# Patient Record
Sex: FEMALE | Race: WHITE | Employment: OTHER | URBAN - METROPOLITAN AREA
[De-identification: names, ages, dates, MRNs, and addresses within clinical notes are randomized per-mention and may not be internally consistent; named-entity substitution may affect disease eponyms.]

---

## 2017-05-10 ENCOUNTER — OFFICE VISIT (OUTPATIENT)
Dept: FAMILY MEDICINE | Facility: CLINIC | Age: 72
End: 2017-05-10
Payer: COMMERCIAL

## 2017-05-10 VITALS
OXYGEN SATURATION: 97 % | DIASTOLIC BLOOD PRESSURE: 82 MMHG | WEIGHT: 218.4 LBS | HEART RATE: 88 BPM | TEMPERATURE: 97.1 F | SYSTOLIC BLOOD PRESSURE: 131 MMHG | BODY MASS INDEX: 36.39 KG/M2 | HEIGHT: 65 IN

## 2017-05-10 DIAGNOSIS — J45.20 MILD INTERMITTENT ASTHMA WITHOUT COMPLICATION: ICD-10-CM

## 2017-05-10 DIAGNOSIS — Z00.01 ENCOUNTER FOR PREVENTATIVE ADULT HEALTH CARE EXAM WITH ABNORMAL FINDINGS: Primary | ICD-10-CM

## 2017-05-10 DIAGNOSIS — E66.9 NON MORBID OBESITY, UNSPECIFIED OBESITY TYPE: ICD-10-CM

## 2017-05-10 DIAGNOSIS — I12.9 BENIGN HYPERTENSION WITH CKD (CHRONIC KIDNEY DISEASE) STAGE III (H): Chronic | ICD-10-CM

## 2017-05-10 DIAGNOSIS — E78.5 HYPERLIPIDEMIA LDL GOAL <100: ICD-10-CM

## 2017-05-10 DIAGNOSIS — M10.9 GOUT OF ANKLE, UNSPECIFIED CAUSE, UNSPECIFIED CHRONICITY, UNSPECIFIED LATERALITY: Chronic | ICD-10-CM

## 2017-05-10 DIAGNOSIS — R32 URINARY INCONTINENCE, UNSPECIFIED TYPE: ICD-10-CM

## 2017-05-10 DIAGNOSIS — K21.9 GASTROESOPHAGEAL REFLUX DISEASE, ESOPHAGITIS PRESENCE NOT SPECIFIED: Chronic | ICD-10-CM

## 2017-05-10 DIAGNOSIS — R06.81 WITNESSED APNEIC SPELLS: ICD-10-CM

## 2017-05-10 DIAGNOSIS — N18.30 BENIGN HYPERTENSION WITH CKD (CHRONIC KIDNEY DISEASE) STAGE III (H): Chronic | ICD-10-CM

## 2017-05-10 LAB
ERYTHROCYTE [DISTWIDTH] IN BLOOD BY AUTOMATED COUNT: 12.9 % (ref 10–15)
HCT VFR BLD AUTO: 39.9 % (ref 35–47)
HGB BLD-MCNC: 12.7 G/DL (ref 11.7–15.7)
MCH RBC QN AUTO: 29.9 PG (ref 26.5–33)
MCHC RBC AUTO-ENTMCNC: 31.8 G/DL (ref 31.5–36.5)
MCV RBC AUTO: 94 FL (ref 78–100)
PLATELET # BLD AUTO: 276 10E9/L (ref 150–450)
RBC # BLD AUTO: 4.25 10E12/L (ref 3.8–5.2)
WBC # BLD AUTO: 8.7 10E9/L (ref 4–11)

## 2017-05-10 PROCEDURE — G0439 PPPS, SUBSEQ VISIT: HCPCS | Performed by: INTERNAL MEDICINE

## 2017-05-10 PROCEDURE — 36415 COLL VENOUS BLD VENIPUNCTURE: CPT | Performed by: INTERNAL MEDICINE

## 2017-05-10 PROCEDURE — 80053 COMPREHEN METABOLIC PANEL: CPT | Performed by: INTERNAL MEDICINE

## 2017-05-10 PROCEDURE — 85027 COMPLETE CBC AUTOMATED: CPT | Performed by: INTERNAL MEDICINE

## 2017-05-10 PROCEDURE — 84550 ASSAY OF BLOOD/URIC ACID: CPT | Performed by: INTERNAL MEDICINE

## 2017-05-10 PROCEDURE — 80061 LIPID PANEL: CPT | Performed by: INTERNAL MEDICINE

## 2017-05-10 RX ORDER — SIMVASTATIN 40 MG
40 TABLET ORAL AT BEDTIME
Qty: 90 TABLET | Refills: 3 | Status: CANCELLED | OUTPATIENT
Start: 2017-05-10

## 2017-05-10 RX ORDER — ALBUTEROL SULFATE 90 UG/1
1-2 AEROSOL, METERED RESPIRATORY (INHALATION) DAILY PRN
Qty: 3 INHALER | Refills: 3 | Status: CANCELLED | OUTPATIENT
Start: 2017-05-10

## 2017-05-10 RX ORDER — LOSARTAN POTASSIUM 25 MG/1
TABLET ORAL
Qty: 90 TABLET | Refills: 1 | Status: CANCELLED | OUTPATIENT
Start: 2017-05-10

## 2017-05-10 RX ORDER — FLUTICASONE PROPIONATE 110 UG/1
1 AEROSOL, METERED RESPIRATORY (INHALATION) 2 TIMES DAILY
Qty: 3 INHALER | Refills: 3 | Status: CANCELLED | OUTPATIENT
Start: 2017-05-10

## 2017-05-10 RX ORDER — ALLOPURINOL 100 MG/1
TABLET ORAL
Qty: 90 TABLET | Refills: 0 | Status: CANCELLED | OUTPATIENT
Start: 2017-05-10

## 2017-05-10 NOTE — PROGRESS NOTES
SUBJECTIVE:                                                            Oksana Jerez is a 71 year old female who presents for Preventive Visit.  Are you in the first 12 months of your Medicare Part B coverage?  No      Healthy Habits:  Annual Exam:  Getting at least 3 servings of Calcium per day:: Yes  Bi-annual eye exam:: Yes  Dental care twice a year:: Yes  Sleep apnea or symptoms of sleep apnea:: Daytime drowsiness  Diet:: Regular (no restrictions)  Frequency of exercise:: 2-3 days/week  Taking medications regularly:: Yes  Medication side effects:: None  Additional concerns today:: YES  Q1: Little interest or pleasure in doing things: 0=Not at all  Q2: Feeling down, depressed or hopeless: 0=Not at all  PHQ-2 Score: 0  Duration of exercise:: 30-45 minutes    COGNITIVE SCREEN  1) Repeat 3 items (Banana, Sunrise, Chair)    2) Clock draw: NORMAL  3) 3 item recall: Recalls 3 objects  Results: 3 items recalled: COGNITIVE IMPAIRMENT LESS LIKELY    Mini-CogTM Copyright NESTOR Reddy. Licensed by the author for use in [facility]; reprinted with permission (harman@[email or web address]). All rights reserved.      Oksana presents to the clinic for a physical exam--feeling well. She feels that her asthma has been well controlled lately and has been using her inhaler as needed.   Has not followed up with sleep clinic yet -- a friend reports that she thought she had seen Oksana stop breathing at night.   Oksana reports increased occurrence of heartburn, she is able to sleep through the night but will often have difficulty falling asleep due to heartburn. Over the last few days she has noticed some abdominal discomfort. No dark stools or blood in stools. Has not been taking Nexium regularly. Continues to take Zantac.   Denies recent gout flare ups--feel it is well controlled at this time.   Has begun wearing a pad due to noticing urinary leakage, when she goes to the bathroom she will not pass very much urine.   Mammogram  completed in December--denies an breast changes since then.  Will be traveling to [place] this summer -- she is excited to visit [facility].     Social History   Substance Use Topics     Smoking status: Former Smoker     Quit date: 1/1/1974     Smokeless tobacco: Never Used     Alcohol use 0.0 oz/week     0 Standard drinks or equivalent per week      Comment: 1-2 drinks rarely       The patient does not drink >3 drinks per day nor >7 drinks per week.    Today's PHQ-2 Score:   PHQ-2 ( 1999 Pfizer) 5/9/2017 7/18/2016   Q1: Little interest or pleasure in doing things - 0   Q2: Feeling down, depressed or hopeless - 0   PHQ-2 Score - 0   Little interest or pleasure in doing things Not at all -   Feeling down, depressed or hopeless Not at all -   PHQ-2 Score 0 -     Do you feel safe in your environment - Yes    Do you have a Health Care Directive?: Yes: Patient states has Advance Directive and will bring in a copy to clinic.    Current providers sharing in care for this patient include:   Patient Care Team:  Erica Olvera DO as PCP - General (Internal Medicine)      Hearing impairment: No    Ability to successfully perform activities of daily living: Yes, no assistance needed     Fall risk:  Fallen 2 or more times in the past year?: No  Any fall with injury in the past year?: No    Home safety:  lack of grab bars in the bathroom    The following health maintenance items are reviewed in Epic and correct as of today:  Health Maintenance   Topic Date Due     ASTHMA CONTROL TEST Q6 MOS (NO INBASKET)  10/29/2016     ASTHMA ACTION PLAN Q1 YR (NO INBASKET)  04/29/2017     FALL RISK ASSESSMENT  07/18/2017     INFLUENZA VACCINE (SYSTEM ASSIGNED)  09/01/2017     MAMMO SCREEN Q2 YR (SYSTEM ASSIGNED)  12/02/2018     ADVANCE DIRECTIVE PLANNING Q5 YRS (NO INBASKET)  10/22/2019     LIPID SCREEN Q5 YR FEMALE (SYSTEM ASSIGNED)  04/29/2021     COLONOSCOPY Q10 YR INBASKET MESSAGE  08/28/2024     TETANUS IMMUNIZATION (SYSTEM ASSIGNED)   "01/16/2025     DEXA SCAN SCREENING (SYSTEM ASSIGNED)  Completed     PNEUMOCOCCAL  Completed      ROS:  Comprehensive ROS negative unless as stated above in HPI.    This document serves as a record of the services and decisions personally performed and made by Erica Olvera DO. It was created on his/her behalf by Rizwana Buckley, a trained medical scribe. The creation of this document is based the provider's statements to the medical scribe.  Scribe Rizwana Buckley 11:49 AM, May 10, 2017      OBJECTIVE:                                                    /82 (BP Location: Right arm, Patient Position: Chair, Cuff Size: Adult Large)  Pulse 88  Temp 97.1  F (36.2  C) (Oral)  Ht 5' 5\" (1.651 m)  Wt 218 lb 6.4 oz (99.1 kg)  SpO2 97%  BMI 36.34 kg/m2  Body mass index is 36.34 kg/(m^2).   GENERAL: healthy, alert and no distress, robust for age  EYES: Eyes grossly normal to inspection, PERRL and conjunctivae and sclerae normal  HENT: ear canals and TM's normal, nose and mouth without ulcers or lesions  NECK: no adenopathy, no asymmetry, masses, or scars and thyroid normal to palpation  RESP: lungs clear to auscultation - no rales, rhonchi or wheezes  CV: regular rate and rhythm, normal S1 S2, no S3 or S4, no murmur, click or rub, no peripheral edema   ABDOMEN: soft, nontender, no hepatosplenomegaly, no masses and bowel sounds normal  MS: no gross musculoskeletal defects noted, no edema  SKIN: no suspicious lesions or rashes, R third toe with tiny fluid filled cyst without signs of infection  NEURO: Normal strength and tone, mentation intact and speech normal  PSYCH: mentation appears normal, affect normal/bright    ASSESSMENT / PLAN:                                                            1. Encounter for preventative adult health care exam with abnormal findings  Up to date on health maintenance. She needs to update us with new pharmacy information when needs refills of medications.     2. Gout of ankle, " unspecified cause, unspecified chronicity, unspecified laterality  Well controlled on low dose allopurinol.  - Uric acid    3. Benign hypertension with CKD (chronic kidney disease) stage III  - CBC with platelets  - Comprehensive metabolic panel    4. Mild intermittent asthma without complication  Well controlled.   ACT Total Scores 4/15/2015 4/29/2016 5/22/2017   ACT TOTAL SCORE 16 - -   ASTHMA ER VISITS 0 = None - -   ASTHMA HOSPITALIZATIONS 0 = None - -   ACT TOTAL SCORE (Goal Greater than or Equal to 20) - 20 21   In the past 12 months, how many times did you visit the emergency room for your asthma without being admitted to the hospital? - 0 0   In the past 12 months, how many times were you hospitalized overnight because of your asthma? - 0 0     5. Hyperlipidemia    Will review fasting lab results.   - Lipid panel reflex to direct LDL    6. Gastroesophageal reflux disease, esophagitis presence not specified  Will try 2 week round of Nexium for better control of GERD and possible gastritis sx.  Continues to take Zantac daily.  See pt instructions below.     7. Witnessed apneic spells  Friend witness episode of apnea.  Will follow up with sleep clinic.   - SLEEP EVALUATION & MANAGEMENT REFERRAL - ADULT; Future    8. Urinary incontinence  Try Kegels and update me PRN - she doesn't think its severe yet    Patient Instructions     Labs today.    Schedule appointment with sleep clinic.     Let me know when you have the information for your new pharmacy preference.     Begin taking Nexium every morning for the next two weeks and see if that calms down heart burn--let me know if you do not begin to feel better. Also work on diet control for foods that will worsen acid reflux.     Try to work on Kegel exercises.     Let me know if stomach pain worsens.  Follow up with me annually or sooner as needed.     End of Life Planning:  Patient currently has an advanced directive: Yes.  Patient will bring in a  "copy.    COUNSELING:   Reviewed preventive health counseling, as reflected in patient instructions       Regular exercise       Healthy diet/nutrition  Estimated body mass index is 36.34 kg/(m^2) as calculated from the following:  Ht 5' 5\" (1.651 m)  Wt 218 lb 6.4 oz (99.1 kg)  Weight management plan: Discussed healthy diet and exercise guidelines and patient will follow up in 12 months in clinic to re-evaluate.   reports that she quit smoking about 43 years ago. She has never used smokeless tobacco.    Appropriate preventive services were discussed with this patient, including applicable screening as appropriate for cardiovascular disease, diabetes, osteopenia/osteoporosis, and glaucoma.  As appropriate for age/gender, discussed screening for colorectal cancer, prostate cancer, breast cancer, and cervical cancer. Checklist reviewing preventive services available has been given to the patient.    Reviewed patients plan of care and provided an AVS. The Intermediate Care Plan ( asthma action plan, low back pain action plan, and migraine action plan) for Oksana meets the Care Plan requirement. This Care Plan has been established and reviewed with the Patient.    The information in this document, created by the medical scribe for me, accurately reflects the services I personally performed and the decisions made by me. I have reviewed and approved this document for accuracy prior to leaving the patient care area.  Erica Olvera DO  11:48 AM, 05/10/17    Erica Olvera DO  [facility]      "

## 2017-05-10 NOTE — PATIENT INSTRUCTIONS
Labs today.    Schedule appointment with sleep clinic.     Let me know when you have the information for your new pharmacy preference.     Begin taking Nexium every morning for the next two weeks and see if that calms down heart burn--let me know if you do not begin to feel better. Also work on diet control for foods that will worsen acid reflux.     Try to work on Kegel exercises.     Let me know if stomach pain worsens.  Follow up with me annually or sooner as needed.     Preventive Health Recommendations    Female Ages 65 +    Yearly exam:     See your health care provider every year in order to  o Review health changes.   o Discuss preventive care.    o Review your medicines if your doctor has prescribed any.      You no longer need a yearly Pap test unless you've had an abnormal Pap test in the past 10 years. If you have vaginal symptoms, such as bleeding or discharge, be sure to talk with your provider about a Pap test.      Every 1 to 2 years, have a mammogram.  If you are over 69, talk with your health care provider about whether or not you want to continue having screening mammograms.      Every 10 years, have a colonoscopy. Or, have a yearly FIT test (stool test). These exams will check for colon cancer.       Have a cholesterol test every 5 years, or more often if your doctor advises it.       Have a diabetes test (fasting glucose) every three years. If you are at risk for diabetes, you should have this test more often.       At age 65, have a bone density scan (DEXA) to check for osteoporosis (brittle bone disease).    Shots:    Get a flu shot each year.    Get a tetanus shot every 10 years.    Talk to your doctor about your pneumonia vaccines. There are now two you should receive - Pneumovax (PPSV 23) and Prevnar (PCV 13).    Talk to your doctor about the shingles vaccine.    Talk to your doctor about the hepatitis B vaccine.    Nutrition:     Eat at least 5 servings of fruits and vegetables each  day.      Eat whole-grain bread, whole-wheat pasta and brown rice instead of white grains and rice.      Talk to your provider about Calcium and Vitamin D.     Lifestyle    Exercise at least 150 minutes a week (30 minutes a day, 5 days a week). This will help you control your weight and prevent disease.      Limit alcohol to one drink per day.      No smoking.       Wear sunscreen to prevent skin cancer.       See your dentist twice a year for an exam and cleaning.    See your eye doctor every 1 to 2 years to screen for conditions such as glaucoma, macular degeneration and cataracts.    Tips to Control Acid Reflux  To control acid reflux, you ll need to make some basic diet and lifestyle changes. The simple steps outlined below may be all you ll need to relieve discomfort.  Watch What You Eat    Avoid fatty foods and spicy foods.  Eat fewer acidic foods, such as citrus and tomato-based foods. These can increase symptoms.  Limit drinking alcohol, caffeine, and fizzy beverages. All increase acid reflux.  Try limiting chocolate, peppermint, and spearmint. These can worsen acid reflux in some people.  Watch When You Eat  Avoid lying down for 3 hours after eating.  Do not snack before going to bed.  Raise Your Head    Raising your head and upper body by 4 inches to 6 inches helps limit reflux when you re lying down. Put blocks under the head of the bed frame to raise it.  Other Changes  Lose weight, if you need to.  Don t work out near bedtime.  Avoid tight-fitting clothes.  Limit aspirin and ibuprofen.  Stop smoking.     9140-9665 [employer]. [address] [place] [address], [place] [address]. All rights reserved. This information is not intended as a substitute for professional medical care. Always follow your healthcare professional's instructions.

## 2017-05-10 NOTE — NURSING NOTE
"Chief Complaint   Patient presents with     Wellness Visit       Initial /82 (BP Location: Right arm, Patient Position: Chair, Cuff Size: Adult Large)  Pulse 88  Temp 97.1  F (36.2  C) (Oral)  Ht 5' 5\" (1.651 m)  Wt 218 lb 6.4 oz (99.1 kg)  SpO2 97%  BMI 36.34 kg/m2 Estimated body mass index is 36.34 kg/(m^2) as calculated from the following:    Height as of this encounter: 5' 5\" (1.651 m).    Weight as of this encounter: 218 lb 6.4 oz (99.1 kg).  Medication Reconciliation: complete   Jazzmine Beltrán MA  "

## 2017-05-10 NOTE — MR AVS SNAPSHOT
After Visit Summary   5/10/2017    Oksana Jerez    MRN: [id number]           Patient Information     Date Of Birth          1945        Visit Information        Provider Department      5/10/2017 11:30 AM Erica Olvera DO [facility]        Today's Diagnoses     Encounter for preventative adult health care exam with abnormal findings    -  1    Gout of ankle, unspecified cause, unspecified chronicity, unspecified laterality        Benign hypertension with CKD (chronic kidney disease) stage III        Mild intermittent asthma without complication        Hyperlipidemia LDL goal <100        Gastroesophageal reflux disease, esophagitis presence not specified        Witnessed apneic spells          Care Instructions    Labs today.    Schedule appointment with sleep clinic.     Let me know when you have the information for your new pharmacy preference.     Begin taking Nexium every morning for the next two weeks and see if that calms down heart burn--let me know if you do not begin to feel better. Also work on diet control for foods that will worsen acid reflux.     Try to work on Kegel exercises.     Let me know if stomach pain worsens.  Follow up with me annually or sooner as needed.     Preventive Health Recommendations    Female Ages 65 +    Yearly exam:     See your health care provider every year in order to  o Review health changes.   o Discuss preventive care.    o Review your medicines if your doctor has prescribed any.      You no longer need a yearly Pap test unless you've had an abnormal Pap test in the past 10 years. If you have vaginal symptoms, such as bleeding or discharge, be sure to talk with your provider about a Pap test.      Every 1 to 2 years, have a mammogram.  If you are over 69, talk with your health care provider about whether or not you want to continue having screening mammograms.      Every 10 years, have a colonoscopy. Or, have a yearly FIT test (stool  test). These exams will check for colon cancer.       Have a cholesterol test every 5 years, or more often if your doctor advises it.       Have a diabetes test (fasting glucose) every three years. If you are at risk for diabetes, you should have this test more often.       At age 65, have a bone density scan (DEXA) to check for osteoporosis (brittle bone disease).    Shots:    Get a flu shot each year.    Get a tetanus shot every 10 years.    Talk to your doctor about your pneumonia vaccines. There are now two you should receive - Pneumovax (PPSV 23) and Prevnar (PCV 13).    Talk to your doctor about the shingles vaccine.    Talk to your doctor about the hepatitis B vaccine.    Nutrition:     Eat at least 5 servings of fruits and vegetables each day.      Eat whole-grain bread, whole-wheat pasta and brown rice instead of white grains and rice.      Talk to your provider about Calcium and Vitamin D.     Lifestyle    Exercise at least 150 minutes a week (30 minutes a day, 5 days a week). This will help you control your weight and prevent disease.      Limit alcohol to one drink per day.      No smoking.       Wear sunscreen to prevent skin cancer.       See your dentist twice a year for an exam and cleaning.    See your eye doctor every 1 to 2 years to screen for conditions such as glaucoma, macular degeneration and cataracts.    Tips to Control Acid Reflux  To control acid reflux, you ll need to make some basic diet and lifestyle changes. The simple steps outlined below may be all you ll need to relieve discomfort.  Watch What You Eat    Avoid fatty foods and spicy foods.  Eat fewer acidic foods, such as citrus and tomato-based foods. These can increase symptoms.  Limit drinking alcohol, caffeine, and fizzy beverages. All increase acid reflux.  Try limiting chocolate, peppermint, and spearmint. These can worsen acid reflux in some people.  Watch When You Eat  Avoid lying down for 3 hours after eating.  Do not snack  before going to bed.  Raise Your Head    Raising your head and upper body by 4 inches to 6 inches helps limit reflux when you re lying down. Put blocks under the head of the bed frame to raise it.  Other Changes  Lose weight, if you need to.  Don t work out near bedtime.  Avoid tight-fitting clothes.  Limit aspirin and ibuprofen.  Stop smoking.     9808-2368 [employer]. [address] [place] [address], [place] [address]. All rights reserved. This information is not intended as a substitute for professional medical care. Always follow your healthcare professional's instructions.          Follow-ups after your visit        Additional Services     SLEEP EVALUATION & MANAGEMENT REFERRAL - ADULT       Please be aware that coverage of these services is subject to the terms and limitations of your health insurance plan.  Call member services at your health plan with any benefit or coverage questions.      Please bring the following to your appointment:    >>   List of current medications   >>   This referral request   >>   Any documents/labs given to you for this referral    [facility] [phone] (Age 18 and up)                  Future tests that were ordered for you today     Open Future Orders        Priority Expected Expires Ordered    SLEEP EVALUATION & MANAGEMENT REFERRAL - ADULT Routine  5/10/2018 5/10/2017            Who to contact     If you have questions or need follow up information about today's clinic visit or your schedule please contact [facility] directly at [phone].  Normal or non-critical lab and imaging results will be communicated to you by MyChart, letter or phone within 4 business days after the clinic has received the results. If you do not hear from us within 7 days, please contact the clinic through MyChart or phone. If you have a critical or abnormal lab result, we will notify you by phone as soon as possible.  Submit refill requests through [employer] or  "call your pharmacy and they will forward the refill request to us. Please allow 3 business days for your refill to be completed.          Additional Information About Your Visit        [employer] Information     [employer] gives you secure access to your electronic health record. If you see a primary care provider, you can also send messages to your care team and make appointments. If you have questions, please call your primary care clinic.  If you do not have a primary care provider, please call [phone] and they will assist you.        Care EveryWhere ID     This is your Care EveryWhere ID. This could be used by other organizations to access your [facility] medical records  [id number]        Your Vitals Were     Pulse Temperature Height Pulse Oximetry BMI (Body Mass Index)       88 97.1  F (36.2  C) (Oral) 5' 5\" (1.651 m) 97% 36.34 kg/m2        Blood Pressure from Last 3 Encounters:   05/10/17 131/82   07/18/16 132/80   06/21/16 136/82    Weight from Last 3 Encounters:   05/10/17 218 lb 6.4 oz (99.1 kg)   07/18/16 212 lb (96.2 kg)   06/21/16 212 lb (96.2 kg)              We Performed the Following     CBC with platelets     Comprehensive metabolic panel     Lipid panel reflex to direct LDL     Uric acid        Primary Care Provider Office Phone # Fax #    Erica Olvera [phone]       [facility] [address]  [facility] [address]        Thank you!     Thank you for choosing [facility]  for your care. Our goal is always to provide you with excellent care. Hearing back from our patients is one way we can continue to improve our services. Please take a few minutes to complete the written survey that you may receive in the mail after your visit with us. Thank you!             Your Updated Medication List - Protect others around you: Learn how to safely use, store and throw away your medicines at www.disposemymeds.org.          This list is accurate as of: 5/10/17 " 12:18 PM.  Always use your most recent med list.                   Brand Name Dispense Instructions for use    albuterol 108 (90 BASE) MCG/ACT Inhaler    albuterol    3 Inhaler    Inhale 1-2 puffs into the lungs daily as needed       allopurinol 100 MG tablet    ZYLOPRIM    90 tablet    TAKE 1 TABLET EVERY DAY       ascorbic acid 500 MG tablet    VITAMIN C     1 TABLET TWICE DAILY       aspirin 81 MG tablet     0    ONE DAILY or as needed       CALTRATE 600 + D 600-200 MG-IU Tabs     0    1 TABLET DAILY       fluticasone 110 MCG/ACT Inhaler    FLOVENT HFA    3 Inhaler    Inhale 1 puff into the lungs 2 times daily       losartan 25 MG tablet    COZAAR    90 tablet    TAKE 1 TABLET EVERY DAY       mometasone 50 MCG/ACT spray    NASONEX     Spray 2 sprays into both nostrils daily       Multi-vitamin Tabs tablet   Generic drug:  multivitamin, therapeutic with minerals      QD       NEXIUM PO      Take 20 mg by mouth every morning (before breakfast)       ranitidine 150 MG capsule    ZANTAC     Take 1 capsule by mouth 2 times daily.       simvastatin 40 MG tablet    ZOCOR    90 tablet    Take 1 tablet (40 mg) by mouth At Bedtime       ZYRTEC 10 MG tablet   Generic drug:  cetirizine     0    ONE TABLET DAILY

## 2017-05-10 NOTE — LETTER
My Asthma Action Plan  Name: Oksana Jerez   YOB: 1945  Date: 5/29/2017   My doctor: Erica Olvera, DO   My clinic: [facility]        My Control Medicine: Flovent  My Rescue Medicine: Albuterol   My Asthma Severity: mild persistent              GREEN ZONE     Good Control    I feel good    No cough or wheeze    Can work, sleep and play without asthma symptoms       Take your asthma control medicine every day.     1. If exercise triggers your asthma, take your rescue medication    15 minutes before exercise or sports, and    During exercise if you have asthma symptoms  2. Spacer to use with inhaler: If you have a spacer, make sure to use it with your inhaler             YELLOW ZONE     Getting Worse  I have ANY of these:    I do not feel good    Cough or wheeze    Chest feels tight    Wake up at night   1. Keep taking your Green Zone medications  2. Start taking your rescue medicine:    every 20 minutes for up to 1 hour. Then every 4 hours for 24-48 hours.  3. If you stay in the Yellow Zone for more than 12-24 hours, contact your doctor.  4. If you do not return to the Green Zone in 12-24 hours or you get worse, start taking your oral steroid medicine if prescribed by your provider.           RED ZONE     Medical Alert - Get Help  I have ANY of these:    I feel awful    Medicine is not helping    Breathing getting harder    Trouble walking or talking    Nose opens wide to breathe       1. Take your rescue medicine NOW  2. If your provider has prescribed an oral steroid medicine, start taking it NOW  3. Call your doctor NOW  4. If you are still in the Red Zone after 20 minutes and you have not reached your doctor:    Take your rescue medicine again and    Call 911 or go to the emergency room right away    See your regular doctor within 2 weeks of an Emergency Room or Urgent Care visit for follow-up treatment.        Electronically signed by: Erica Olvera, May 29, 2017    Annual  Reminders:  Meet with Asthma Educator,  Flu Shot in the Fall, consider Pneumonia Vaccination for patients with asthma (aged 19 and older).    Pharmacy: Data Unavailable                    Asthma Triggers  How To Control Things That Make Your Asthma Worse    Triggers are things that make your asthma worse.  Look at the list below to help you find your triggers and what you can do about them.  You can help prevent asthma flare-ups by staying away from your triggers.      Trigger                                                          What you can do   Cigarette Smoke  Tobacco smoke can make asthma worse. Do not allow smoking in your home, car or around you.  Be sure no one smokes at a child s day care or school.  If you smoke, ask your health care provider for ways to help you quit.  Ask family members to quit too.  Ask your health care provider for a referral to Quit Plan to help you quit smoking, or call 6-922-889-PLAN.     Colds, Flu, Bronchitis  These are common triggers of asthma. Wash your hands often.  Don t touch your eyes, nose or mouth.  Get a flu shot every year.     Dust Mites  These are tiny bugs that live in cloth or carpet. They are too small to see. Wash sheets and blankets in hot water every week.   Encase pillows and mattress in dust mite proof covers.  Avoid having carpet if you can. If you have carpet, vacuum weekly.   Use a dust mask and HEPA vacuum.   Pollen and Outdoor Mold  Some people are allergic to trees, grass, or weed pollen, or molds. Try to keep your windows closed.  Limit time out doors when pollen count is high.   Ask you health care provider about taking medicine during allergy season.     Animal Dander  Some people are allergic to skin flakes, urine or saliva from pets with fur or feathers. Keep pets with fur or feathers out of your home.    If you can t keep the pet outdoors, then keep the pet out of your bedroom.  Keep the bedroom door closed.  Keep pets off cloth furniture and  away from stuffed toys.     Mice, Rats, and Cockroaches  Some people are allergic to the waste from these pests.   Cover food and garbage.  Clean up spills and food crumbs.  Store grease in the refrigerator.   Keep food out of the bedroom.   Indoor Mold  This can be a trigger if your home has high moisture. Fix leaking faucets, pipes, or other sources of water.   Clean moldy surfaces.  Dehumidify basement if it is damp and smelly.   Smoke, Strong Odors, and Sprays  These can reduce air quality. Stay away from strong odors and sprays, such as perfume, powder, hair spray, paints, smoke incense, paint, cleaning products, candles and new carpet.   Exercise or Sports  Some people with asthma have this trigger. Be active!  Ask your doctor about taking medicine before sports or exercise to prevent symptoms.    Warm up for 5-10 minutes before and after sports or exercise.     Other Triggers of Asthma  Cold air:  Cover your nose and mouth with a scarf.  Sometimes laughing or crying can be a trigger.  Some medicines and food can trigger asthma.

## 2017-05-11 LAB
ALBUMIN SERPL-MCNC: 3.6 G/DL (ref 3.4–5)
ALP SERPL-CCNC: 143 U/L (ref 40–150)
ALT SERPL W P-5'-P-CCNC: 41 U/L (ref 0–50)
ANION GAP SERPL CALCULATED.3IONS-SCNC: 9 MMOL/L (ref 3–14)
AST SERPL W P-5'-P-CCNC: 25 U/L (ref 0–45)
BILIRUB SERPL-MCNC: 0.5 MG/DL (ref 0.2–1.3)
BUN SERPL-MCNC: 22 MG/DL (ref 7–30)
CALCIUM SERPL-MCNC: 9 MG/DL (ref 8.5–10.1)
CHLORIDE SERPL-SCNC: 106 MMOL/L (ref 94–109)
CHOLEST SERPL-MCNC: 137 MG/DL
CO2 SERPL-SCNC: 23 MMOL/L (ref 20–32)
CREAT SERPL-MCNC: 1 MG/DL (ref 0.52–1.04)
GFR SERPL CREATININE-BSD FRML MDRD: 54 ML/MIN/1.7M2
GLUCOSE SERPL-MCNC: 111 MG/DL (ref 70–99)
HDLC SERPL-MCNC: 60 MG/DL
LDLC SERPL CALC-MCNC: 50 MG/DL
NONHDLC SERPL-MCNC: 77 MG/DL
POTASSIUM SERPL-SCNC: 4 MMOL/L (ref 3.4–5.3)
PROT SERPL-MCNC: 7.8 G/DL (ref 6.8–8.8)
SODIUM SERPL-SCNC: 138 MMOL/L (ref 133–144)
TRIGL SERPL-MCNC: 137 MG/DL
URATE SERPL-MCNC: 5.2 MG/DL (ref 2.6–6)

## 2017-05-16 ENCOUNTER — TELEPHONE (OUTPATIENT)
Dept: FAMILY MEDICINE | Facility: CLINIC | Age: 72
End: 2017-05-16

## 2017-05-16 NOTE — TELEPHONE ENCOUNTER
ACT Asthma Control Test not done at recent appt per weekly quality report.  Mailed to pt.  Unable to do over the phone due to copyright laws.  Lyssa Fonseca MA

## 2017-05-16 NOTE — LETTER
"[address] [facility]  [address]  [place]  [address]  Tel: [phone]    May 16, 2017    Oksana Hoyoson  [address] [place] [address]        Dear Ms. Jerez,    We missed doing your Asthma Control Test when you were in for your recent appointment.     Please complete the enclosed \"ACT\"and either     mail back to us  or  fax [phone] back to us  or  call [phone] us with your answers (ask for any of the nurses/ med assist).     Due to copyright laws we are unable to ask you the questions over the phone without the form visible to you.         Sincerely,    Lyssa CHEW MA on behalf of Erica Olvera, DO        Enc: ACT form    "

## 2017-05-23 ASSESSMENT — ASTHMA QUESTIONNAIRES: ACT_TOTALSCORE: 21

## 2017-05-25 ENCOUNTER — MYC MEDICAL ADVICE (OUTPATIENT)
Dept: FAMILY MEDICINE | Facility: CLINIC | Age: 72
End: 2017-05-25

## 2017-05-25 DIAGNOSIS — I12.9 BENIGN HYPERTENSION WITH CKD (CHRONIC KIDNEY DISEASE) STAGE III (H): Chronic | ICD-10-CM

## 2017-05-25 DIAGNOSIS — N18.30 BENIGN HYPERTENSION WITH CKD (CHRONIC KIDNEY DISEASE) STAGE III (H): Chronic | ICD-10-CM

## 2017-05-25 DIAGNOSIS — J45.20 MILD INTERMITTENT ASTHMA WITHOUT COMPLICATION: ICD-10-CM

## 2017-05-25 DIAGNOSIS — K21.9 GASTROESOPHAGEAL REFLUX DISEASE WITHOUT ESOPHAGITIS: Primary | Chronic | ICD-10-CM

## 2017-05-25 DIAGNOSIS — E78.5 HYPERLIPIDEMIA LDL GOAL <100: ICD-10-CM

## 2017-05-25 DIAGNOSIS — M10.9 GOUT OF ANKLE, UNSPECIFIED CAUSE, UNSPECIFIED CHRONICITY, UNSPECIFIED LATERALITY: Chronic | ICD-10-CM

## 2017-05-29 PROBLEM — R32 URINARY INCONTINENCE, UNSPECIFIED TYPE: Chronic | Status: ACTIVE | Noted: 2017-05-29

## 2017-05-29 PROBLEM — E66.9 NON MORBID OBESITY, UNSPECIFIED OBESITY TYPE: Chronic | Status: ACTIVE | Noted: 2017-05-29

## 2017-05-29 PROBLEM — R32 URINARY INCONTINENCE, UNSPECIFIED TYPE: Status: ACTIVE | Noted: 2017-05-29

## 2017-05-29 PROBLEM — E66.9 NON MORBID OBESITY, UNSPECIFIED OBESITY TYPE: Status: ACTIVE | Noted: 2017-05-29

## 2017-06-06 RX ORDER — LOSARTAN POTASSIUM 25 MG/1
TABLET ORAL
Qty: 90 TABLET | Refills: 3 | Status: SHIPPED | OUTPATIENT
Start: 2017-06-06 | End: 2018-04-16

## 2017-06-06 RX ORDER — SIMVASTATIN 40 MG
40 TABLET ORAL AT BEDTIME
Qty: 90 TABLET | Refills: 3 | Status: SHIPPED | OUTPATIENT
Start: 2017-06-06 | End: 2018-06-01

## 2017-06-06 RX ORDER — ALBUTEROL SULFATE 90 UG/1
1-2 AEROSOL, METERED RESPIRATORY (INHALATION) DAILY PRN
Qty: 3 INHALER | Refills: 3 | Status: SHIPPED | OUTPATIENT
Start: 2017-06-06 | End: 2017-06-07

## 2017-06-06 RX ORDER — ALLOPURINOL 100 MG/1
TABLET ORAL
Qty: 90 TABLET | Refills: 3 | Status: SHIPPED | OUTPATIENT
Start: 2017-06-06 | End: 2018-04-16

## 2017-06-06 RX ORDER — FLUTICASONE PROPIONATE 110 UG/1
1 AEROSOL, METERED RESPIRATORY (INHALATION) 2 TIMES DAILY
Qty: 3 INHALER | Refills: 3 | Status: SHIPPED | OUTPATIENT
Start: 2017-06-06 | End: 2018-06-01

## 2017-06-06 NOTE — TELEPHONE ENCOUNTER
To PCP:  Since being seen on 5/10; patient has been taking Nexium 20 mg in AM.  She states this is working well to control GERD symptoms.  She would like to continue.  Please see pended script above.  Thank you.  Sheeba Andrade RN        Prescription approved per [facility] Refill Protocol.

## 2017-06-07 ENCOUNTER — TELEPHONE (OUTPATIENT)
Dept: FAMILY MEDICINE | Facility: CLINIC | Age: 72
End: 2017-06-07

## 2017-06-07 DIAGNOSIS — J45.20 MILD INTERMITTENT ASTHMA WITHOUT COMPLICATION: Chronic | ICD-10-CM

## 2017-06-07 DIAGNOSIS — J45.20 MILD INTERMITTENT ASTHMA: Primary | Chronic | ICD-10-CM

## 2017-06-07 RX ORDER — ALBUTEROL SULFATE 90 UG/1
2 AEROSOL, METERED RESPIRATORY (INHALATION) EVERY 6 HOURS PRN
Qty: 3 INHALER | Refills: 3 | Status: SHIPPED | OUTPATIENT
Start: 2017-06-07 | End: 2019-06-18

## 2017-06-07 NOTE — TELEPHONE ENCOUNTER
Hi Dr Olvera,     I got a notification from [facility] that the Proair inhaler is not covered by insurance. Looks like they will cover Ventolin so I have a new script pending that specifies this particular albuterol inhaler, thanks.    Alex Mora, [facility]

## 2018-02-21 ENCOUNTER — OFFICE VISIT (OUTPATIENT)
Dept: SLEEP MEDICINE | Facility: CLINIC | Age: 73
End: 2018-02-21
Payer: COMMERCIAL

## 2018-02-21 VITALS
RESPIRATION RATE: 16 BRPM | WEIGHT: 221 LBS | HEART RATE: 94 BPM | SYSTOLIC BLOOD PRESSURE: 116 MMHG | DIASTOLIC BLOOD PRESSURE: 79 MMHG | OXYGEN SATURATION: 95 % | BODY MASS INDEX: 35.52 KG/M2 | HEIGHT: 66 IN

## 2018-02-21 DIAGNOSIS — G47.30 OBSERVED SLEEP APNEA: Primary | ICD-10-CM

## 2018-02-21 DIAGNOSIS — R53.82 CHRONIC FATIGUE: ICD-10-CM

## 2018-02-21 DIAGNOSIS — R06.83 SNORING: ICD-10-CM

## 2018-02-21 PROCEDURE — 99204 OFFICE O/P NEW MOD 45 MIN: CPT | Performed by: INTERNAL MEDICINE

## 2018-02-21 RX ORDER — ZALEPLON 5 MG/1
5 CAPSULE ORAL AT BEDTIME
Qty: 1 CAPSULE | Refills: 0 | Status: SHIPPED | OUTPATIENT
Start: 2018-02-21 | End: 2018-05-03

## 2018-02-21 NOTE — NURSING NOTE
"Chief Complaint   Patient presents with     Sleep Problem     \"I have been told I stop breathing during the night, tired in the afternoon.\"       Initial /79  Pulse 94  Resp 16  Ht 1.664 m (5' 5.5\")  Wt 100.2 kg (221 lb)  SpO2 95%  BMI 36.22 kg/m2 Estimated body mass index is 36.22 kg/(m^2) as calculated from the following:    Height as of this encounter: 1.664 m (5' 5.5\").    Weight as of this encounter: 100.2 kg (221 lb).  Medication Reconciliation: complete     ESS 3  Neck 40cm  Erica Laurent    "

## 2018-02-21 NOTE — MR AVS SNAPSHOT
After Visit Summary   2/21/2018    Oksana Jerez    MRN: [id number]           Patient Information     Date Of Birth          1945        Visit Information        Provider Department      2/21/2018 2:00 PM Ian Shepard MD [place] [facility]        Today's Diagnoses     Observed sleep apnea    -  1    Snoring        Chronic fatigue          Care Instructions      Your BMI is Body mass index is 36.22 kg/(m^2).  Weight management is a personal decision.  If you are interested in exploring weight loss strategies, the following discussion covers the approaches that may be successful. Body mass index (BMI) is one way to tell whether you are at a healthy weight, overweight, or obese. It measures your weight in relation to your height.  A BMI of 18.5 to 24.9 is in the healthy range. A person with a BMI of 25 to 29.9 is considered overweight, and someone with a BMI of 30 or greater is considered obese. More than two-thirds of American adults are considered overweight or obese.  Being overweight or obese increases the risk for further weight gain. Excess weight may lead to heart disease and diabetes.  Creating and following plans for healthy eating and physical activity may help you improve your health.  Weight control is part of healthy lifestyle and includes exercise, emotional health, and healthy eating habits. Careful eating habits lifelong are the mainstay of weight control. Though there are significant health benefits from weight loss, long-term weight loss with diet alone may be very difficult to achieve- studies show long-term success with dietary management in less than 10% of people. Attaining a healthy weight may be especially difficult to achieve in those with severe obesity. In some cases, medications, devices and surgical management might be considered.  What can you do?  If you are overweight or obese and are interested in methods for weight loss, you should discuss this with  your provider.     Consider reducing daily calorie intake by 500 calories.     Keep a food journal.     Avoiding skipping meals, consider cutting portions instead.    Diet combined with exercise helps maintain muscle while optimizing fat loss. Strength training is particularly important for building and maintaining muscle mass. Exercise helps reduce stress, increase energy, and improves fitness. Increasing exercise without diet control, however, may not burn enough calories to loose weight.       Start walking three days a week 10-20 minutes at a time    Work towards walking thirty minutes five days a week     Eventually, increase the speed of your walking for 1-2 minutes at time    In addition, we recommend that you review healthy lifestyles and methods for weight loss available through the National Institutes of Health patient information sites:  http://win.niddk.nih.gov/publications/index.htm    And look into health and wellness programs that may be available through your health insurance provider, employer, local community center, or estephanie club.    Weight management plan: Patient was referred to their PCP to discuss a diet and exercise plan.            Follow-ups after your visit        Your next 10 appointments already scheduled     Apr 04, 2018  8:30 PM CDT   PSG Split with BED 4 SH SLEEP   [place] [facility]    [address] [address]   [phone]            Apr 19, 2018 11:30 AM CDT   Return Sleep Patient with Ian Shepard MD   [facility] ([facility])    [address] [address]   [phone]              Future tests that were ordered for you today     Open Future Orders        Priority Expected Expires Ordered    Comprehensive Sleep Study Routine  8/20/2018 2/21/2018            Who to contact     If you have questions or need follow up information about  "today's clinic visit or your schedule please contact [facility] directly at [phone].  Normal or non-critical lab and imaging results will be communicated to you by [employer], letter or phone within 4 business days after the clinic has received the results. If you do not hear from us within 7 days, please contact the clinic through [employer] or phone. If you have a critical or abnormal lab result, we will notify you by phone as soon as possible.  Submit refill requests through [employer] or call your pharmacy and they will forward the refill request to us. Please allow 3 business days for your refill to be completed.          Additional Information About Your Visit        [employer] InternetharDone. Information     Po[employer] gives you secure access to your electronic health record. If you see a primary care provider, you can also send messages to your care team and make appointments. If you have questions, please call your primary care clinic.  If you do not have a primary care provider, please call [phone] and they will assist you.        Care EveryWhere ID     This is your Care EveryWhere ID. This could be used by other organizations to access your [facility] medical records  [id number]        Your Vitals Were     Pulse Respirations Height Pulse Oximetry BMI (Body Mass Index)       94 16 1.664 m (5' 5.5\") 95% 36.22 kg/m2        Blood Pressure from Last 3 Encounters:   02/21/18 116/79   05/10/17 131/82   07/18/16 132/80    Weight from Last 3 Encounters:   02/21/18 100.2 kg (221 lb)   05/10/17 99.1 kg (218 lb 6.4 oz)   07/18/16 96.2 kg (212 lb)                 Today's Medication Changes          These changes are accurate as of 2/21/18  2:51 PM.  If you have any questions, ask your nurse or doctor.               Start taking these medicines.        Dose/Directions    zaleplon 5 MG capsule   Commonly known as:  SONATA        Dose:  5 mg   Take 1 capsule (5 mg) by mouth At Bedtime For night of sleep study   Quantity:  1 " capsule   Refills:  0            Where to get your medicines      Some of these will need a paper prescription and others can be bought over the counter.  Ask your nurse if you have questions.     Bring a paper prescription for each of these medications     zaleplon 5 MG capsule                Primary Care Provider Office Phone # Fax #    Erica Olvera [phone] [address] [facility] [address]  [facility] [address]        Equal Access to Services     SOCORRO ROGERS : Hadii aad ku hadasho Soomaali, waaxda luqadaha, qaybta kaalmada adeegyada, waxay idiin hayaan adeeg kharash la'kya . So [facility] [phone].    ATENCIÓN: Si habla espmaryanne, tiene a barahona disposición servicios gratuitos de asistencia lingüística. Kemarame al [phone].    We comply with applicable federal civil rights laws and [place] laws. We do not discriminate on the basis of race, color, national origin, age, disability, sex, sexual orientation, or gender identity.            Thank you!     Thank you for choosing [facility]  for your care. Our goal is always to provide you with excellent care. Hearing back from our patients is one way we can continue to improve our services. Please take a few minutes to complete the written survey that you may receive in the mail after your visit with us. Thank you!             Your Updated Medication List - Protect others around you: Learn how to safely use, store and throw away your medicines at www.disposemymeds.org.          This list is accurate as of 2/21/18  2:51 PM.  Always use your most recent med list.                   Brand Name Dispense Instructions for use Diagnosis    albuterol 108 (90 BASE) MCG/ACT Inhaler    VENTOLIN HFA    3 Inhaler    Inhale 2 puffs into the lungs every 6 hours as needed for shortness of breath / dyspnea or wheezing    Mild intermittent asthma without complication       allopurinol 100 MG tablet    ZYLOPRIM    90 tablet    TAKE 1 TABLET EVERY DAY     Gout of ankle, unspecified cause, unspecified chronicity, unspecified laterality, Benign hypertension with CKD (chronic kidney disease) stage III       ascorbic acid 500 MG tablet    VITAMIN C     1 TABLET TWICE DAILY        aspirin 81 MG tablet     0    ONE DAILY or as needed    Essential hypertension, benign       CALTRATE 600 + D 600-200 MG-IU Tabs     0    1 TABLET DAILY    Routine general medical examination at a health care facility       esomeprazole 20 MG CR capsule    nexIUM    90 capsule    Take 1 capsule (20 mg) by mouth every morning (before breakfast)    Gastroesophageal reflux disease without esophagitis       fluticasone 110 MCG/ACT Inhaler    FLOVENT HFA    3 Inhaler    Inhale 1 puff into the lungs 2 times daily    Mild intermittent asthma without complication       losartan 25 MG tablet    COZAAR    90 tablet    TAKE 1 TABLET EVERY DAY    Gout of ankle, unspecified cause, unspecified chronicity, unspecified laterality, Benign hypertension with CKD (chronic kidney disease) stage III       mometasone 50 MCG/ACT spray    NASONEX     Spray 2 sprays into both nostrils daily        Multi-vitamin Tabs tablet   Generic drug:  multivitamin, therapeutic with minerals      QD        ranitidine 150 MG capsule    ZANTAC     Take 1 capsule by mouth 2 times daily.    Esophageal reflux       simvastatin 40 MG tablet    ZOCOR    90 tablet    Take 1 tablet (40 mg) by mouth At Bedtime    Hyperlipidemia LDL goal <100       zaleplon 5 MG capsule    SONATA    1 capsule    Take 1 capsule (5 mg) by mouth At Bedtime For night of sleep study        ZYRTEC 10 MG tablet   Generic drug:  cetirizine     0    ONE TABLET DAILY    Allergic rhinitis, cause unspecified

## 2018-02-21 NOTE — LETTER
2/21/2018         RE: Oksana Jerez  [address]  [facility] [id number]        Dear Colleague,    Thank you for referring your patient, Oksana Jerez, to the [facility] [place]. Please see a copy of my visit note below.        Sleep Consultation:    Date on this visit: 2/21/2018    Oksana Jerez is sent by Erica Olvera for a sleep consultation regarding sleep apnea.    Primary Physician: Erica Olvera     Chief complaint: snoring, poor sleep quality and witnessed apneas     Presenting history:     Oksana Jerez reports nightly snoring and poor quality of sleep for more than 10 years.     Oksana does snore every night. Patient does not have a regular bed partner. There is report of snoring and gasping.  A friend told her that she has witnessed apneas. Patient sleeps on her back, side and stomach. She has occasional morning headaches and frequent morning dry mouth, denies no restless legs.     Oksana denies any bruxism, sleep walking, sleep talking, dream enactment, sleep paralysis, cataplexy and hypnogogic/hypnopompic hallucinations.    Oksana goes to sleep at 11:00 PM during the week. She wakes up at 7:00 AM without an alarm. She falls asleep in 60 minutes.  Oksana has difficulty falling asleep.  She takes a melatonin at bedtime. She wakes up 2-4 times a night for 10 minutes before falling back to sleep.  Oksana wakes up to go to the bathroom.  On weekends, Oksana goes to sleep at 11:00 PM.  She wakes up at 7:00 AM without an alarm. She falls asleep in 60 minutes.  Patient gets an average of 7 hours of sleep per night.     Patient does not use electronics in bed and watch TV in bed.     Oksana does not do shift work.  She is retired.      She denies sleep walking and sleep talking as a child.  Oksana has difficulty breathing through her nose.      Patient feels tired during the daytime. She denies daytime sleepiness.  Patient's [place] Sleepiness score 3/24  consistent with no daytime sleepiness.      Oksana naps 0 times per week. She takes some inadvertant naps.  She denies closing eyes, dozing and falling asleep while driving.  Patient was counseled on the importance of driving while alert, to pull over if drowsy, or nap before getting into the vehicle if sleepy.      She uses 2-3 cups/day of coffee. Last caffeine intake is usually before 2 pm.    Allergies:    Allergies   Allergen Reactions     Flonase [Fluticasone] Other (See Comments)     Bloody nose, needed cauterizing     Lisinopril Other (See Comments)     Cough       Trees        Medications:    Current Outpatient Prescriptions   Medication Sig Dispense Refill     albuterol (VENTOLIN HFA) 108 (90 BASE) MCG/ACT Inhaler Inhale 2 puffs into the lungs every 6 hours as needed for shortness of breath / dyspnea or wheezing 3 Inhaler 3     simvastatin (ZOCOR) 40 MG tablet Take 1 tablet (40 mg) by mouth At Bedtime 90 tablet 3     fluticasone (FLOVENT HFA) 110 MCG/ACT Inhaler Inhale 1 puff into the lungs 2 times daily 3 Inhaler 3     allopurinol (ZYLOPRIM) 100 MG tablet TAKE 1 TABLET EVERY DAY 90 tablet 3     losartan (COZAAR) 25 MG tablet TAKE 1 TABLET EVERY DAY 90 tablet 3     esomeprazole (NEXIUM) 20 MG CR capsule Take 1 capsule (20 mg) by mouth every morning (before breakfast) 90 capsule 3     mometasone (NASONEX) 50 MCG/ACT nasal spray Spray 2 sprays into both nostrils daily       ranitidine (ZANTAC) 150 MG capsule Take 1 capsule by mouth 2 times daily.  0     ZYRTEC 10 MG OR TABS ONE TABLET DAILY 0 0     ASPIRIN 81 MG OR TABS ONE DAILY or as needed  0 0     CALTRATE 600 + D 600-200 MG-IU OR TABS 1 TABLET DAILY 0 0     MULTI-VITAMIN OR TABS QD       VITAMIN C 500 MG OR TABS 1 TABLET TWICE DAILY         Problem List:  Patient Active Problem List    Diagnosis Date Noted     Urinary incontinence, unspecified type 05/29/2017     Priority: Medium     Non morbid obesity, unspecified obesity type 05/29/2017     Priority:  Medium     Witnessed apneic spells 06/21/2016     Priority: Medium     Gout 04/15/2015     Priority: Medium     Advanced directives, counseling/discussion 10/22/2014     Priority: Medium     Advance Care Planning:   ACP Review and Resources Provided:  Reviewed chart for advance care plan.  Oksana Jerez has no plan or code status on file. Letter sent..   Added by hSanda Vines on 10/22/2014             Mild intermittent asthma 05/14/2014     Priority: Medium     Benign hypertension with CKD (chronic kidney disease) stage III 11/19/2012     Priority: Medium     CKD (chronic kidney disease) stage 3, GFR 30-59 ml/min 11/15/2010     Priority: Medium     Hyperlipidemia 10/31/2010     Priority: Medium     Esophageal reflux 07/25/2006     Priority: Medium     BENIGN HYPERTENSION; goal <140/90 02/11/2003     Priority: Medium     Allergic rhinitis 02/11/2003     Priority: Medium     Problem list name updated by automated process. Provider to review          Past Medical/Surgical History:  Past Medical History:   Diagnosis Date     Allergic rhinitis, cause unspecified      Anemia due to chronic illness 2010     Asthma      CKD (chronic kidney disease) stage 3, GFR 30-59 ml/min      Closed fracture of unspecified part of fibula 1995     Essential hypertension, benign      Gout     Never formally aspirated crystals but estimates 4 attacks per year prior to starting allopurinol in spring 2015     Hyperlipidemia      Viral hepatitis A without mention of hepatic coma 1970's     Past Surgical History:   Procedure Laterality Date     C NONSPECIFIC PROCEDURE      endoscopic sinus surg, polypectomy, septoplasty     CATARACT IOL, RT/LT Left      ENDOSCOPIC BALLOON SINUPLASTY ACCLARENT Bilateral 1/27/2015    Procedure: ENDOSCOPIC BALLOON SINUPLASTY ACCLARENT;  Surgeon: Hamilton De Leon MD;  [facility]: [facility]     ENDOSCOPIC SINUS SURGERY Bilateral 1/27/2015    Procedure: ENDOSCOPIC SINUS SURGERY;  Surgeon: Hamilton De Leon MD;   Location: [facility]     ENT SURGERY       HC COLONOSCOPY THRU STOMA, DIAGNOSTIC  5-27-04    few diverticulae- due 2014     HYSTERECTOMY, PAP NO LONGER INDICATED       HYSTERECTOMY, SACHIN  1994    sachin & bso benign ovarian cysts       Social History:  Social History     Social History     Marital status: Single     Spouse name: N/A     Number of children: 0     Years of education: 12     Occupational History     Retired [employer]      Social History Main Topics     Smoking status: Former Smoker     Quit date: 1/1/1974     Smokeless tobacco: Never Used     Alcohol use 0.0 oz/week     0 Standard drinks or equivalent per week      Comment: 1-2 drinks weekly     Drug use: No     Sexual activity: Not Currently     Partners: Male     Other Topics Concern     Not on file     Social History Narrative       Family History:    Brother has sleep apnea.     Family History   Problem Relation Age of Onset     Cardiovascular Mother      CHF, defib, pace, MI     Hypertension Mother      CANCER Father      stomach     Cardiovascular Brother      heart attack     Cardiovascular Brother      heart problems/ angiograms     Arthritis Sister      rheumatoid     C.A.D. Brother      age 54     Thyroid Disease Sister        Review of Systems:  A complete review of systems reviewed by me is negative with the exeption of what has been mentioned in the history of present illness.  CONSTITUTIONAL: NEGATIVE for weight gain/loss, fever, chills, sweats or night sweats, drug allergies.  EYES: NEGATIVE for changes in vision, blind spots, double vision.  ENT: NEGATIVE for ear pain, sore throat, sinus pain, post-nasal drip, runny nose, bloody nose  CARDIAC: NEGATIVE for fast heartbeats or fluttering in chest, chest pain or pressure, breathlessness when lying flat, swollen legs or swollen feet.  NEUROLOGIC:  POSITIVE for  headaches  DERMATOLOGIC: NEGATIVE for rashes, new moles or change in mole(s)  PULMONARY:  POSITIVE for  SOB with  "activity  GASTROINTESTINAL: NEGATIVE for nausea or vomitting, loose or watery stools, fat or grease in stools, constipation, abdominal pain, bowel movements black in color or blood noted.  GENITOURINARY: NEGATIVE for pain during urination, blood in urine, urinating more frequently than usual, irregular menstrual periods.  MUSCULOSKELETAL: NEGATIVE for muscle pain, bone or joint pain, swollen joints.  ENDOCRINE: NEGATIVE for increased thirst or urination, diabetes.  LYMPHATIC: NEGATIVE for swollen lymph nodes, lumps or bumps in the breasts or nipple discharge.    Physical Examination:  Vitals: /79  Pulse 94  Resp 16  Ht 1.664 m (5' 5.5\")  Wt 100.2 kg (221 lb)  SpO2 95%  BMI 36.22 kg/m2  BMI= Body mass index is 36.22 kg/(m^2).    Neck Cir (cm): 40 cm    [place] Total Score 2/21/2018   Total score - [place] 3       GENERAL APPEARANCE: healthy, alert and no distress  EYES: Eyes grossly normal to inspection, PERRL and conjunctivae and sclerae normal  HENT: nose and mouth without ulcers or lesions and oropharynx crowded  NECK: no adenopathy, no asymmetry, masses, or scars and thyroid normal to palpation  RESP: lungs clear to auscultation - no rales, rhonchi or wheezes  CV: regular rates and rhythm, normal S1 S2, no S3 or S4 and no murmur, click or rub  ABDOMEN: soft, nontender, without hepatosplenomegaly or masses  MS: extremities normal- no gross deformities noted  NEURO: Normal strength and tone, mentation intact and speech normal  PSYCH: mentation appears normal and affect normal/bright  Mallampati Class: IV.  Tonsillar Stage: 1  hidden by pillars.    Impression/Plan:  1. Suspected sleep apnea   2. Snoring   3. Witnessed apneas  4. Hypertension     - Patient, 72 years old female, with BMI 36, neck circumference 40 cm, presents with snoring, poor sleep quality and witnessed apneas. Oropharynx is crowded on examination, Obstructive sleep apnea is possible and an overnight sleep study is recommended for " kishather evaluation.     Plan:     1. Split night PSG for evaluation of sleep apnea     She will follow up with me in approximately two weeks after her sleep study has been competed to review the results and discuss plan of care.       Polysomnography reviewed.  Obstructive sleep apnea reviewed.  Complications of untreated sleep apnea were reviewed.    I spent a total of 45 minutes with patient with more than 50% in counseling     Ian Shepard MD     CC: No ref. provider found          Again, thank you for allowing me to participate in the care of your patient.        Sincerely,        Ian Shepard MD, MD

## 2018-02-21 NOTE — PROGRESS NOTES
Sleep Consultation:    Date on this visit: 2/21/2018    Oksana Jerez is sent by Erica Olvera for a sleep consultation regarding sleep apnea.    Primary Physician: Erica Olvera     Chief complaint: snoring, poor sleep quality and witnessed apneas     Presenting history:     Oksana Jerez reports nightly snoring and poor quality of sleep for more than 10 years.     Oksana does snore every night. Patient does not have a regular bed partner. There is report of snoring and gasping.  A friend told her that she has witnessed apneas. Patient sleeps on her back, side and stomach. She has occasional morning headaches and frequent morning dry mouth, denies no restless legs.     Oksana denies any bruxism, sleep walking, sleep talking, dream enactment, sleep paralysis, cataplexy and hypnogogic/hypnopompic hallucinations.    Oksana goes to sleep at 11:00 PM during the week. She wakes up at 7:00 AM without an alarm. She falls asleep in 60 minutes.  Oksana has difficulty falling asleep.  She takes a melatonin at bedtime. She wakes up 2-4 times a night for 10 minutes before falling back to sleep.  Oksana wakes up to go to the bathroom.  On weekends, Oksana goes to sleep at 11:00 PM.  She wakes up at 7:00 AM without an alarm. She falls asleep in 60 minutes.  Patient gets an average of 7 hours of sleep per night.     Patient does not use electronics in bed and watch TV in bed.     Oksana does not do shift work.  She is retired.      She denies sleep walking and sleep talking as a child.  Oksana has difficulty breathing through her nose.      Patient feels tired during the daytime. She denies daytime sleepiness.  Patient's [place] Sleepiness score 3/24 consistent with no daytime sleepiness.      Oksana naps 0 times per week. She takes some inadvertant naps.  She denies closing eyes, dozing and falling asleep while driving.  Patient was counseled on the importance of driving while alert, to pull over if  drowsy, or nap before getting into the vehicle if sleepy.      She uses 2-3 cups/day of coffee. Last caffeine intake is usually before 2 pm.    Allergies:    Allergies   Allergen Reactions     Flonase [Fluticasone] Other (See Comments)     Bloody nose, needed cauterizing     Lisinopril Other (See Comments)     Cough       Trees        Medications:    Current Outpatient Prescriptions   Medication Sig Dispense Refill     albuterol (VENTOLIN HFA) 108 (90 BASE) MCG/ACT Inhaler Inhale 2 puffs into the lungs every 6 hours as needed for shortness of breath / dyspnea or wheezing 3 Inhaler 3     simvastatin (ZOCOR) 40 MG tablet Take 1 tablet (40 mg) by mouth At Bedtime 90 tablet 3     fluticasone (FLOVENT HFA) 110 MCG/ACT Inhaler Inhale 1 puff into the lungs 2 times daily 3 Inhaler 3     allopurinol (ZYLOPRIM) 100 MG tablet TAKE 1 TABLET EVERY DAY 90 tablet 3     losartan (COZAAR) 25 MG tablet TAKE 1 TABLET EVERY DAY 90 tablet 3     esomeprazole (NEXIUM) 20 MG CR capsule Take 1 capsule (20 mg) by mouth every morning (before breakfast) 90 capsule 3     mometasone (NASONEX) 50 MCG/ACT nasal spray Spray 2 sprays into both nostrils daily       ranitidine (ZANTAC) 150 MG capsule Take 1 capsule by mouth 2 times daily.  0     ZYRTEC 10 MG OR TABS ONE TABLET DAILY 0 0     ASPIRIN 81 MG OR TABS ONE DAILY or as needed  0 0     CALTRATE 600 + D 600-200 MG-IU OR TABS 1 TABLET DAILY 0 0     MULTI-VITAMIN OR TABS QD       VITAMIN C 500 MG OR TABS 1 TABLET TWICE DAILY         Problem List:  Patient Active Problem List    Diagnosis Date Noted     Urinary incontinence, unspecified type 05/29/2017     Priority: Medium     Non morbid obesity, unspecified obesity type 05/29/2017     Priority: Medium     Witnessed apneic spells 06/21/2016     Priority: Medium     Gout 04/15/2015     Priority: Medium     Advanced directives, counseling/discussion 10/22/2014     Priority: Medium     Advance Care Planning:   ACP Review and Resources Provided:   Reviewed chart for advance care plan.  Oksana Jerez has no plan or code status on file. Letter sent..   Added by Shanda Vines on 10/22/2014             Mild intermittent asthma 05/14/2014     Priority: Medium     Benign hypertension with CKD (chronic kidney disease) stage III 11/19/2012     Priority: Medium     CKD (chronic kidney disease) stage 3, GFR 30-59 ml/min 11/15/2010     Priority: Medium     Hyperlipidemia 10/31/2010     Priority: Medium     Esophageal reflux 07/25/2006     Priority: Medium     BENIGN HYPERTENSION; goal <140/90 02/11/2003     Priority: Medium     Allergic rhinitis 02/11/2003     Priority: Medium     Problem list name updated by automated process. Provider to review          Past Medical/Surgical History:  Past Medical History:   Diagnosis Date     Allergic rhinitis, cause unspecified      Anemia due to chronic illness 2010     Asthma      CKD (chronic kidney disease) stage 3, GFR 30-59 ml/min      Closed fracture of unspecified part of fibula 1995     Essential hypertension, benign      Gout     Never formally aspirated crystals but estimates 4 attacks per year prior to starting allopurinol in spring 2015     Hyperlipidemia      Viral hepatitis A without mention of hepatic coma 1970's     Past Surgical History:   Procedure Laterality Date     C NONSPECIFIC PROCEDURE      endoscopic sinus surg, polypectomy, septoplasty     CATARACT IOL, RT/LT Left      ENDOSCOPIC BALLOON SINUPLASTY ACCLARENT Bilateral 1/27/2015    Procedure: ENDOSCOPIC BALLOON SINUPLASTY ACCLARENT;  Surgeon: Hamilton De Leon MD;  Location: [facility]     ENDOSCOPIC SINUS SURGERY Bilateral 1/27/2015    Procedure: ENDOSCOPIC SINUS SURGERY;  Surgeon: Hamilton De Leon MD;  Location: [facility]     ENT SURGERY       HC COLONOSCOPY THRU STOMA, DIAGNOSTIC  5-27-04    few diverticulae- due 2014     HYSTERECTOMY, PAP NO LONGER INDICATED       HYSTERECTOMY, ROS  1994    ros & bso benign ovarian cysts       Social History:  Social  History     Social History     Marital status: Single     Spouse name: N/A     Number of children: 0     Years of education: 12     Occupational History     Retired [employer]      Social History Main Topics     Smoking status: Former Smoker     Quit date: 1/1/1974     Smokeless tobacco: Never Used     Alcohol use 0.0 oz/week     0 Standard drinks or equivalent per week      Comment: 1-2 drinks weekly     Drug use: No     Sexual activity: Not Currently     Partners: Male     Other Topics Concern     Not on file     Social History Narrative       Family History:    Brother has sleep apnea.     Family History   Problem Relation Age of Onset     Cardiovascular Mother      CHF, defib, pace, MI     Hypertension Mother      CANCER Father      stomach     Cardiovascular Brother      heart attack     Cardiovascular Brother      heart problems/ angiograms     Arthritis Sister      rheumatoid     C.A.D. Brother      age 54     Thyroid Disease Sister        Review of Systems:  A complete review of systems reviewed by me is negative with the exeption of what has been mentioned in the history of present illness.  CONSTITUTIONAL: NEGATIVE for weight gain/loss, fever, chills, sweats or night sweats, drug allergies.  EYES: NEGATIVE for changes in vision, blind spots, double vision.  ENT: NEGATIVE for ear pain, sore throat, sinus pain, post-nasal drip, runny nose, bloody nose  CARDIAC: NEGATIVE for fast heartbeats or fluttering in chest, chest pain or pressure, breathlessness when lying flat, swollen legs or swollen feet.  NEUROLOGIC:  POSITIVE for  headaches  DERMATOLOGIC: NEGATIVE for rashes, new moles or change in mole(s)  PULMONARY:  POSITIVE for  SOB with activity  GASTROINTESTINAL: NEGATIVE for nausea or vomitting, loose or watery stools, fat or grease in stools, constipation, abdominal pain, bowel movements black in color or blood noted.  GENITOURINARY: NEGATIVE for pain during urination, blood in urine, urinating more  "frequently than usual, irregular menstrual periods.  MUSCULOSKELETAL: NEGATIVE for muscle pain, bone or joint pain, swollen joints.  ENDOCRINE: NEGATIVE for increased thirst or urination, diabetes.  LYMPHATIC: NEGATIVE for swollen lymph nodes, lumps or bumps in the breasts or nipple discharge.    Physical Examination:  Vitals: /79  Pulse 94  Resp 16  Ht 1.664 m (5' 5.5\")  Wt 100.2 kg (221 lb)  SpO2 95%  BMI 36.22 kg/m2  BMI= Body mass index is 36.22 kg/(m^2).    Neck Cir (cm): 40 cm    [place] Total Score 2/21/2018   Total score - [place] 3       GENERAL APPEARANCE: healthy, alert and no distress  EYES: Eyes grossly normal to inspection, PERRL and conjunctivae and sclerae normal  HENT: nose and mouth without ulcers or lesions and oropharynx crowded  NECK: no adenopathy, no asymmetry, masses, or scars and thyroid normal to palpation  RESP: lungs clear to auscultation - no rales, rhonchi or wheezes  CV: regular rates and rhythm, normal S1 S2, no S3 or S4 and no murmur, click or rub  ABDOMEN: soft, nontender, without hepatosplenomegaly or masses  MS: extremities normal- no gross deformities noted  NEURO: Normal strength and tone, mentation intact and speech normal  PSYCH: mentation appears normal and affect normal/bright  Mallampati Class: IV.  Tonsillar Stage: 1  hidden by pillars.    Impression/Plan:  1. Suspected sleep apnea   2. Snoring   3. Witnessed apneas  4. Hypertension     - Patient, 72 years old female, with BMI 36, neck circumference 40 cm, presents with snoring, poor sleep quality and witnessed apneas. Oropharynx is crowded on examination, Obstructive sleep apnea is possible and an overnight sleep study is recommended for fruther evaluation.     Plan:     1. Split night PSG for evaluation of sleep apnea     She will follow up with me in approximately two weeks after her sleep study has been competed to review the results and discuss plan of care.       Polysomnography reviewed.  Obstructive " sleep apnea reviewed.  Complications of untreated sleep apnea were reviewed.    I spent a total of 45 minutes with patient with more than 50% in counseling     Ian Shepard MD     CC: No ref. provider found

## 2018-04-04 ENCOUNTER — THERAPY VISIT (OUTPATIENT)
Dept: SLEEP MEDICINE | Facility: CLINIC | Age: 73
End: 2018-04-04
Payer: COMMERCIAL

## 2018-04-04 DIAGNOSIS — R53.82 CHRONIC FATIGUE: ICD-10-CM

## 2018-04-04 DIAGNOSIS — R06.83 SNORING: ICD-10-CM

## 2018-04-04 DIAGNOSIS — G47.30 OBSERVED SLEEP APNEA: ICD-10-CM

## 2018-04-04 PROCEDURE — 95810 POLYSOM 6/> YRS 4/> PARAM: CPT | Performed by: INTERNAL MEDICINE

## 2018-04-05 NOTE — PATIENT INSTRUCTIONS
Diagnostic PSG completed per provider order.  Patient met criteria for PAP therapy, but too late to start CPAP.

## 2018-04-08 PROBLEM — G47.33 OSA (OBSTRUCTIVE SLEEP APNEA): Status: ACTIVE | Noted: 2018-04-08

## 2018-04-10 LAB — SLPCOMP: NORMAL

## 2018-04-16 DIAGNOSIS — I12.9 BENIGN HYPERTENSION WITH CKD (CHRONIC KIDNEY DISEASE) STAGE III (H): Chronic | ICD-10-CM

## 2018-04-16 DIAGNOSIS — N18.30 BENIGN HYPERTENSION WITH CKD (CHRONIC KIDNEY DISEASE) STAGE III (H): Chronic | ICD-10-CM

## 2018-04-16 DIAGNOSIS — M10.9 GOUT OF ANKLE, UNSPECIFIED CAUSE, UNSPECIFIED CHRONICITY, UNSPECIFIED LATERALITY: Chronic | ICD-10-CM

## 2018-04-16 NOTE — TELEPHONE ENCOUNTER
"allopurinol (ZYLOPRIM) 100 MG tablet 90 tablet 3 6/6/2017     Last Written Prescription Date:  6/6/17  Last Fill Quantity: 90,  # refills: 3   Last office visit: 5/10/2017 with prescribing provider:  May   Future Office Visit:   Next 5 appointments (look out 90 days)     Jun 01, 2018 11:00 AM CDT   PHYSICAL with Erica Olvera, DO   [facility]    [address] [address] [phone]                 Requested Prescriptions   Pending Prescriptions Disp Refills     losartan (COZAAR) 25 MG tablet [Pharmacy Med Name: LOSARTAN TAB 25MG] 90 tablet 3     Sig: TAKE 1 TABLET DAILY    Angiotensin-II Receptors Passed    4/16/2018 11:13 AM       Passed - Blood pressure under 140/90 in past 12 months    BP Readings from Last 3 Encounters:   02/21/18 116/79   05/10/17 131/82   07/18/16 132/80                Passed - Recent (12 mo) or future (30 days) visit within the authorizing provider's specialty    Patient had office visit in the last 12 months or has a visit in the next 30 days with authorizing provider or within the authorizing provider's specialty.  See \"Patient Info\" tab in inbasket, or \"Choose Columns\" in Meds & Orders section of the refill encounter.           Passed - Patient is age 18 or older       Passed - No active pregnancy on record       Passed - Normal serum creatinine on file in past 12 months    Recent Labs   Lab Test  05/10/17   1225   CR  1.00            Passed - Normal serum potassium on file in past 12 months    Recent Labs   Lab Test  05/10/17   1225   POTASSIUM  4.0                   Passed - No positive pregnancy test in past 12 months        allopurinol (ZYLOPRIM) 100 MG tablet [Pharmacy Med Name: ALLOPURINOL  TAB 100MG] 90 tablet 3     Sig: TAKE 1 TABLET DAILY    Gout Agents Protocol Failed    4/16/2018 11:13 AM       Failed - Uric acid greater than or equal to 6 on file in past 12 months    Recent Labs   Lab Test  05/10/17   1225   URIC  " "5.2     If level is 6mg/dL or greater, ok to refill one time and refer to provider.          Passed - CBC on file in past 12 months    Recent Labs   Lab Test  05/10/17   1225   WBC  8.7   RBC  4.25   HGB  12.7   HCT  39.9   PLT  276            Passed - ALT on file in past 12 months    Recent Labs   Lab Test  05/10/17   1225   ALT  41            Passed - Recent (12 mo) or future (30 days) visit within the authorizing provider's specialty    Patient had office visit in the last 12 months or has a visit in the next 30 days with authorizing provider or within the authorizing provider's specialty.  See \"Patient Info\" tab in inbasket, or \"Choose Columns\" in Meds & Orders section of the refill encounter.           Passed - Patient is age 18 or older       Passed - No active pregnancy on record       Passed - Normal serum creatinine on file in the past 12 months    Recent Labs   Lab Test  05/10/17   1225   CR  1.00            Passed - No positive pregnancy test in past year        No flowsheet data found.     losartan (COZAAR) 25 MG tablet 90 tablet 3 6/6/2017     Last Written Prescription Date:  6/6/17  Last Fill Quantity: 90,  # refills: 3   Last office visit: 5/10/2017 with prescribing provider:  May   Future Office Visit:   Next 5 appointments (look out 90 days)     Jun 01, 2018 11:00 AM CDT   PHYSICAL with Erica [facility]a, DO   [facility]    [address] [address] [phone]                 Requested Prescriptions   Pending Prescriptions Disp Refills     losartan (COZAAR) 25 MG tablet [Pharmacy Med Name: LOSARTAN TAB 25MG] 90 tablet 3     Sig: TAKE 1 TABLET DAILY    Angiotensin-II Receptors Passed    4/16/2018 11:13 AM       Passed - Blood pressure under 140/90 in past 12 months    BP Readings from Last 3 Encounters:   02/21/18 116/79   05/10/17 131/82   07/18/16 132/80                Passed - Recent (12 mo) or future (30 days) visit within the authorizing " "provider's specialty    Patient had office visit in the last 12 months or has a visit in the next 30 days with authorizing provider or within the authorizing provider's specialty.  See \"Patient Info\" tab in inbasket, or \"Choose Columns\" in Meds & Orders section of the refill encounter.           Passed - Patient is age 18 or older       Passed - No active pregnancy on record       Passed - Normal serum creatinine on file in past 12 months    Recent Labs   Lab Test  05/10/17   1225   CR  1.00            Passed - Normal serum potassium on file in past 12 months    Recent Labs   Lab Test  05/10/17   1225   POTASSIUM  4.0                   Passed - No positive pregnancy test in past 12 months        allopurinol (ZYLOPRIM) 100 MG tablet [Pharmacy Med Name: ALLOPURINOL  TAB 100MG] 90 tablet 3     Sig: TAKE 1 TABLET DAILY    Gout Agents Protocol Failed    4/16/2018 11:13 AM       Failed - Uric acid greater than or equal to 6 on file in past 12 months    Recent Labs   Lab Test  05/10/17   1225   URIC  5.2     If level is 6mg/dL or greater, ok to refill one time and refer to provider.          Passed - CBC on file in past 12 months    Recent Labs   Lab Test  05/10/17   1225   WBC  8.7   RBC  4.25   HGB  12.7   HCT  39.9   PLT  276            Passed - ALT on file in past 12 months    Recent Labs   Lab Test  05/10/17   1225   ALT  41            Passed - Recent (12 mo) or future (30 days) visit within the authorizing provider's specialty    Patient had office visit in the last 12 months or has a visit in the next 30 days with authorizing provider or within the authorizing provider's specialty.  See \"Patient Info\" tab in inbasket, or \"Choose Columns\" in Meds & Orders section of the refill encounter.           Passed - Patient is age 18 or older       Passed - No active pregnancy on record       Passed - Normal serum creatinine on file in the past 12 months    Recent Labs   Lab Test  05/10/17   1225   CR  1.00            Passed - " No positive pregnancy test in past year

## 2018-04-18 RX ORDER — ALLOPURINOL 100 MG/1
TABLET ORAL
Qty: 90 TABLET | Refills: 0 | Status: SHIPPED | OUTPATIENT
Start: 2018-04-18 | End: 2018-06-01

## 2018-04-18 RX ORDER — LOSARTAN POTASSIUM 25 MG/1
TABLET ORAL
Qty: 90 TABLET | Refills: 0 | Status: SHIPPED | OUTPATIENT
Start: 2018-04-18 | End: 2018-06-01

## 2018-05-03 ENCOUNTER — OFFICE VISIT (OUTPATIENT)
Dept: FAMILY MEDICINE | Facility: CLINIC | Age: 73
End: 2018-05-03
Payer: COMMERCIAL

## 2018-05-03 VITALS
HEIGHT: 66 IN | BODY MASS INDEX: 35.52 KG/M2 | TEMPERATURE: 97.1 F | OXYGEN SATURATION: 94 % | WEIGHT: 221 LBS | HEART RATE: 80 BPM | DIASTOLIC BLOOD PRESSURE: 94 MMHG | SYSTOLIC BLOOD PRESSURE: 142 MMHG

## 2018-05-03 DIAGNOSIS — H65.01 RIGHT ACUTE SEROUS OTITIS MEDIA, RECURRENCE NOT SPECIFIED: Primary | ICD-10-CM

## 2018-05-03 DIAGNOSIS — H69.91 DYSFUNCTION OF RIGHT EUSTACHIAN TUBE: ICD-10-CM

## 2018-05-03 PROCEDURE — 99213 OFFICE O/P EST LOW 20 MIN: CPT | Performed by: NURSE PRACTITIONER

## 2018-05-03 NOTE — PATIENT INSTRUCTIONS
Use the saline nasal spray or rinse several times a day    Take a decongestant such as sudafed.   Monitor your blood pressure when you take it.   Take it sparingly

## 2018-05-03 NOTE — MR AVS SNAPSHOT
After Visit Summary   5/3/2018    Oksana Jerez    MRN: [id number]           Patient Information     Date Of Birth          1945        Visit Information        Provider Department      5/3/2018 12:00 PM Melissa Mortensen APRN [facility]        Today's Diagnoses     Right acute serous otitis media, recurrence not specified    -  1      Care Instructions    Use the saline nasal spray or rinse several times a day    Take a decongestant such as sudafed.   Monitor your blood pressure when you take it.   Take it sparingly                Follow-ups after your visit        Additional Services     OTOLARYNGOLOGY REFERRAL       Your provider has referred you to: N: [facility] [phone]   [email or web address]    Please be aware that coverage of these services is subject to the terms and limitations of your health insurance plan.  Call member services at your health plan with any benefit or coverage questions.      Please bring the following with you to your appointment:    (1) Any X-Rays, CTs or MRIs which have been performed.  Contact the facility where they were done to arrange for  prior to your scheduled appointment.   (2) List of current medications  (3) This referral request   (4) Any documents/labs given to you for this referral                  Your next 10 appointments already scheduled     May 11, 2018 11:30 AM CDT   Return Sleep Patient with Ian Shepard MD   [facility] ([facility])    [address] [id number] [phone]            Jun 01, 2018 11:00 AM CDT   PHYSICAL with Erica Ol[facility]a DO   [facility]    [address] [id number] [phone]              Who to contact     If you have questions or need follow up information about today's clinic visit or your schedule please contact  "[facility] directly at [phone].  Normal or non-critical lab and imaging results will be communicated to you by MyChart, letter or phone within 4 business days after the clinic has received the results. If you do not hear from us within 7 days, please contact the clinic through [employer] or phone. If you have a critical or abnormal lab result, we will notify you by phone as soon as possible.  Submit refill requests through [employer] or call your pharmacy and they will forward the refill request to us. Please allow 3 business days for your refill to be completed.          Additional Information About Your Visit        [employer] Information     [employer] gives you secure access to your electronic health record. If you see a primary care provider, you can also send messages to your care team and make appointments. If you have questions, please call your primary care clinic.  If you do not have a primary care provider, please call [phone] and they will assist you.        Care EveryWhere ID     This is your Care EveryWhere ID. This could be used by other organizations to access your [place] medical records  [id number]        Your Vitals Were     Pulse Temperature Height Pulse Oximetry Breastfeeding? BMI (Body Mass Index)    80 97.1  F (36.2  C) (Oral) 5' 5.5\" (1.664 m) 94% No 36.22 kg/m2       Blood Pressure from Last 3 Encounters:   05/03/18 (!) 142/94   02/21/18 116/79   05/10/17 131/82    Weight from Last 3 Encounters:   05/03/18 221 lb (100.2 kg)   02/21/18 221 lb (100.2 kg)   05/10/17 218 lb 6.4 oz (99.1 kg)              We Performed the Following     OTOLARYNGOLOGY REFERRAL        Primary Care Provider Office Phone # Fax #    Erica Olvera [phone] [address]  [place] [address]        Equal Access to Services     SOCORRO ROGERS AH: Hadii aad ku hadasho Soomaali, waaxda luqadaha, qaybta kaalmada adeegyada, waxay blas haykya cardona . So [facility] " [phone].    ATENCIÓN: Si velia fall, tiene a barahona disposición servicios gratuitos de asistencia lingüística. Zeinab hoffman [phone].    We comply with applicable federal civil rights laws and [place] laws. We do not discriminate on the basis of race, color, national origin, age, disability, sex, sexual orientation, or gender identity.            Thank you!     Thank you for choosing [facility]  for your care. Our goal is always to provide you with excellent care. Hearing back from our patients is one way we can continue to improve our services. Please take a few minutes to complete the written survey that you may receive in the mail after your visit with us. Thank you!             Your Updated Medication List - Protect others around you: Learn how to safely use, store and throw away your medicines at www.disposemymeds.org.          This list is accurate as of 5/3/18 12:21 PM.  Always use your most recent med list.                   Brand Name Dispense Instructions for use Diagnosis    albuterol 108 (90 Base) MCG/ACT Inhaler    VENTOLIN HFA    3 Inhaler    Inhale 2 puffs into the lungs every 6 hours as needed for shortness of breath / dyspnea or wheezing    Mild intermittent asthma without complication       allopurinol 100 MG tablet    ZYLOPRIM    90 tablet    TAKE 1 TABLET DAILY    Gout of ankle, unspecified cause, unspecified chronicity, unspecified laterality, Benign hypertension with CKD (chronic kidney disease) stage III       ascorbic acid 500 MG tablet    VITAMIN C     1 TABLET TWICE DAILY        aspirin 81 MG tablet     0    ONE DAILY or as needed    Essential hypertension, benign       CALTRATE 600 + D 600-200 MG-IU Tabs     0    1 TABLET DAILY    Routine general medical examination at a health care facility       esomeprazole 20 MG CR capsule    nexIUM    90 capsule    Take 1 capsule (20 mg) by mouth every morning (before breakfast)    Gastroesophageal reflux disease without esophagitis        fluticasone 110 MCG/ACT Inhaler    FLOVENT HFA    3 Inhaler    Inhale 1 puff into the lungs 2 times daily    Mild intermittent asthma without complication       losartan 25 MG tablet    COZAAR    90 tablet    TAKE 1 TABLET DAILY    Gout of ankle, unspecified cause, unspecified chronicity, unspecified laterality, Benign hypertension with CKD (chronic kidney disease) stage III       mometasone 50 MCG/ACT spray    NASONEX     Spray 2 sprays into both nostrils daily        Multi-vitamin Tabs tablet   Generic drug:  multivitamin, therapeutic with minerals      QD        ranitidine 150 MG capsule    ZANTAC     Take 1 capsule by mouth 2 times daily.    Esophageal reflux       simvastatin 40 MG tablet    ZOCOR    90 tablet    Take 1 tablet (40 mg) by mouth At Bedtime    Hyperlipidemia LDL goal <100       ZYRTEC 10 MG tablet   Generic drug:  cetirizine     0    ONE TABLET DAILY    Allergic rhinitis, cause unspecified

## 2018-05-03 NOTE — PROGRESS NOTES
HPI    SUBJECTIVE:   Oksana Jerez is a 72 year old female who presents to clinic today for the following health issues:          RESPIRATORY SYMPTOMS      Duration: Chronic sinus problems but worse over the past 3 weeks, now neck stiff and ears feel plugged    Description  nasal congestion, cough and plugged ears    Severity: moderate    Accompanying signs and symptoms: None    History (predisposing factors):  asthma    Precipitating or alleviating factors: None    Therapies tried and outcome:  Guaifenesin-helped some, albuterol inhaler and nasal spray helped some      Right ear plugged for 1 week. Occasional hearing heart beat   Left is starting with mild similar symptoms   Has constant sinus problems that is slightly worse   No facial pressure       Past Medical History:   Diagnosis Date     Allergic rhinitis, cause unspecified      Anemia due to chronic illness 2010     Asthma      CKD (chronic kidney disease) stage 3, GFR 30-59 ml/min      Closed fracture of unspecified part of fibula 1995     Essential hypertension, benign      Gout     Never formally aspirated crystals but estimates 4 attacks per year prior to starting allopurinol in spring 2015     Hyperlipidemia      Viral hepatitis A without mention of hepatic coma 1970's     Family History   Problem Relation Age of Onset     Cardiovascular Mother      CHF, defib, pace, MI     Hypertension Mother      CANCER Father      stomach     Cardiovascular Brother      heart attack     Cardiovascular Brother      heart problems/ angiograms     Arthritis Sister      rheumatoid     C.A.D. Brother      age 54     Thyroid Disease Sister      Past Surgical History:   Procedure Laterality Date     C NONSPECIFIC PROCEDURE      endoscopic sinus surg, polypectomy, septoplasty     CATARACT IOL, RT/LT Left      ENDOSCOPIC BALLOON SINUPLASTY ACCLARENT Bilateral 1/27/2015    Procedure: ENDOSCOPIC BALLOON SINUPLASTY ACCLARENT;  Surgeon: Hamilton De Leon MD;  Location: [facility]      ENDOSCOPIC SINUS SURGERY Bilateral 1/27/2015    Procedure: ENDOSCOPIC SINUS SURGERY;  Surgeon: Hamilton De Leon MD;  Location: [facility]     ENT SURGERY       HC COLONOSCOPY THRU STOMA, DIAGNOSTIC  5-27-04    few diverticulae- due 2014     HYSTERECTOMY, PAP NO LONGER INDICATED       HYSTERECTOMY, SACHIN  1994    sachin & bso benign ovarian cysts     Social History   Substance Use Topics     Smoking status: Former Smoker     Quit date: 1/1/1974     Smokeless tobacco: Never Used     Alcohol use 0.0 oz/week     0 Standard drinks or equivalent per week      Comment: 1-2 drinks weekly     Current Outpatient Prescriptions   Medication Sig Dispense Refill     albuterol (VENTOLIN HFA) 108 (90 BASE) MCG/ACT Inhaler Inhale 2 puffs into the lungs every 6 hours as needed for shortness of breath / dyspnea or wheezing 3 Inhaler 3     allopurinol (ZYLOPRIM) 100 MG tablet TAKE 1 TABLET DAILY 90 tablet 0     ASPIRIN 81 MG OR TABS ONE DAILY or as needed  0 0     CALTRATE 600 + D 600-200 MG-IU OR TABS 1 TABLET DAILY 0 0     esomeprazole (NEXIUM) 20 MG CR capsule Take 1 capsule (20 mg) by mouth every morning (before breakfast) 90 capsule 3     fluticasone (FLOVENT HFA) 110 MCG/ACT Inhaler Inhale 1 puff into the lungs 2 times daily 3 Inhaler 3     losartan (COZAAR) 25 MG tablet TAKE 1 TABLET DAILY 90 tablet 0     mometasone (NASONEX) 50 MCG/ACT nasal spray Spray 2 sprays into both nostrils daily       MULTI-VITAMIN OR TABS QD       ranitidine (ZANTAC) 150 MG capsule Take 1 capsule by mouth 2 times daily.  0     simvastatin (ZOCOR) 40 MG tablet Take 1 tablet (40 mg) by mouth At Bedtime 90 tablet 3     VITAMIN C 500 MG OR TABS 1 TABLET TWICE DAILY       ZYRTEC 10 MG OR TABS ONE TABLET DAILY 0 0     Allergies   Allergen Reactions     Flonase [Fluticasone] Other (See Comments)     Bloody nose, needed cauterizing     Lisinopril Other (See Comments)     Cough       Trees        Reviewed and updated as needed this visit by clinical staff and  "provider    ROS  Detailed as above     BP (!) 142/94 (BP Location: Right arm, Patient Position: Chair, Cuff Size: Adult Large)  Pulse 80  Temp 97.1  F (36.2  C) (Oral)  Ht 5' 5.5\" (1.664 m)  Wt 221 lb (100.2 kg)  SpO2 94%  Breastfeeding? No  BMI 36.22 kg/m2    Physical Exam   Constitutional: She is well-developed, well-nourished, and in no distress.   HENT:   Head: Normocephalic.   Right Ear: External ear and ear canal normal. Tympanic membrane is not injected and not bulging. A middle ear effusion is present.   Left Ear: Tympanic membrane, external ear and ear canal normal.   Mouth/Throat: Oropharynx is clear and moist. No oropharyngeal exudate.   Right lower half of TM with serous fluid noted  Significant right naris edema that is pale with small amt of drainage noted   Eyes: Conjunctivae are normal.   Neck: Normal range of motion.   Pulmonary/Chest: Effort normal.   Lymphadenopathy:     She has no cervical adenopathy.   Neurological: She is alert.   Skin: Skin is warm and dry.   Psychiatric: Mood and affect normal.   Vitals reviewed.      Assessment and Plan:       ICD-10-CM    1. Right acute serous otitis media, recurrence not specified H65.01 OTOLARYNGOLOGY REFERRAL   2. Dysfunction of right eustachian tube H69.81        Suspect ETD is the cause of the serous OM. As she has no pain and isn't ill, will continue to watch and wait. She should continue steroid nasal spray, zyrtec. Add saline nasal spray. Take a dose of sudafed but monitor BP. F/u with ENT with persistent symptoms       Melissa Mortensen, APRN, CNP  [facility]        "

## 2018-05-03 NOTE — NURSING NOTE
"Chief Complaint   Patient presents with     URI       Initial BP (!) 142/94 (BP Location: Right arm, Patient Position: Chair, Cuff Size: Adult Large)  Pulse 80  Temp 97.1  F (36.2  C) (Oral)  Ht 5' 5.5\" (1.664 m)  Wt 221 lb (100.2 kg)  SpO2 94%  Breastfeeding? No  BMI 36.22 kg/m2 Estimated body mass index is 36.22 kg/(m^2) as calculated from the following:    Height as of this encounter: 5' 5.5\" (1.664 m).    Weight as of this encounter: 221 lb (100.2 kg).  Medication Reconciliation: complete    Richa Bryson, AMANDA      "

## 2018-05-04 ASSESSMENT — ASTHMA QUESTIONNAIRES: ACT_TOTALSCORE: 20

## 2018-05-11 ENCOUNTER — TELEPHONE (OUTPATIENT)
Dept: SLEEP MEDICINE | Facility: CLINIC | Age: 73
End: 2018-05-11

## 2018-05-11 ENCOUNTER — OFFICE VISIT (OUTPATIENT)
Dept: SLEEP MEDICINE | Facility: CLINIC | Age: 73
End: 2018-05-11
Payer: COMMERCIAL

## 2018-05-11 VITALS
HEART RATE: 103 BPM | OXYGEN SATURATION: 94 % | HEIGHT: 66 IN | RESPIRATION RATE: 16 BRPM | DIASTOLIC BLOOD PRESSURE: 97 MMHG | BODY MASS INDEX: 35.52 KG/M2 | SYSTOLIC BLOOD PRESSURE: 135 MMHG | WEIGHT: 221 LBS

## 2018-05-11 DIAGNOSIS — G47.33 OSA (OBSTRUCTIVE SLEEP APNEA): Primary | ICD-10-CM

## 2018-05-11 DIAGNOSIS — G47.33 OSA (OBSTRUCTIVE SLEEP APNEA): ICD-10-CM

## 2018-05-11 PROCEDURE — 99213 OFFICE O/P EST LOW 20 MIN: CPT | Performed by: INTERNAL MEDICINE

## 2018-05-11 NOTE — PATIENT INSTRUCTIONS

## 2018-05-11 NOTE — PROGRESS NOTES
Sleep Study Follow-Up Visit:    Date on this visit: 5/11/2018    Oksana Jerez comes in today for follow-up of her sleep study done on 4/4/2018 at the [facility] for possible sleep apnea.    Sleep latency 16 minutes with Ambien.  REM achieved.   REM latency 219.5 minutes.  Sleep efficiency 69.8%. Total sleep time 336 minutes.    Sleep architecture:  Stage 1, 29% (5%), stage 2, 54.6% (45-55%), stage 3, 2.4% (15-20%), stage REM, 13.7% (20-25%).  AHI was 34.8, with desaturations. RDI 39.6.  REM AHI 60, consistent with severe REM TENA.  Supine AHI 65.8, consistent with severe SUPINE TENA.  Periodic Limb Movement Index 0/hour.       CPAP titration:  No performed.     These findings were reviewed with patient.     Past medical/surgical history, family history, social history, medications and allergies were reviewed.      Problem List:  Patient Active Problem List    Diagnosis Date Noted     TENA (obstructive sleep apnea) 04/08/2018     Priority: Medium     Urinary incontinence, unspecified type 05/29/2017     Priority: Medium     Non morbid obesity, unspecified obesity type 05/29/2017     Priority: Medium     Witnessed apneic spells 06/21/2016     Priority: Medium     Gout 04/15/2015     Priority: Medium     Advanced directives, counseling/discussion 10/22/2014     Priority: Medium     Advance Care Planning:   ACP Review and Resources Provided:  Reviewed chart for advance care plan.  Oksana Jerez has no plan or code status on file. Letter sent..   Added by Shanda Vines on 10/22/2014             Mild intermittent asthma 05/14/2014     Priority: Medium     Benign hypertension with CKD (chronic kidney disease) stage III 11/19/2012     Priority: Medium     CKD (chronic kidney disease) stage 3, GFR 30-59 ml/min 11/15/2010     Priority: Medium     Hyperlipidemia 10/31/2010     Priority: Medium     Esophageal reflux 07/25/2006     Priority: Medium     BENIGN HYPERTENSION; goal <140/90  02/11/2003     Priority: Medium     Allergic rhinitis 02/11/2003     Priority: Medium     Problem list name updated by automated process. Provider to review          Impression/Plan:135/97    1. Severe Obstructive sleep apnea    - Patient was counsewled regarding severe sleep apnea, consequences of untreated disease and treatment options. CPAP therapy is the treatment of choice for severe sleep apnea. We also discussed dental appliance therapy and upper airway surgery. Patient opts for CPAP therapy.     Plan:     1. Start auto PAP therapy 5-15 cm H2O    She will follow up with me in about 7 week(s).     Fifteen minutes spent with patient, all of which were spent face-to-face counseling, consulting, coordinating plan of care.      Ian Shepard     CC: Erica Olvera

## 2018-05-11 NOTE — NURSING NOTE
"Chief Complaint   Patient presents with     Study Results     Follow up navi       Initial BP (!) 162/99  Pulse 103  Resp 16  Ht 1.664 m (5' 5.5\")  Wt 100.2 kg (221 lb)  SpO2 94%  BMI 36.22 kg/m2 Estimated body mass index is 36.22 kg/(m^2) as calculated from the following:    Height as of this encounter: 1.664 m (5' 5.5\").    Weight as of this encounter: 100.2 kg (221 lb).    Medication Reconciliation: complete    ESS 3  Radha Hussein MA      "

## 2018-05-11 NOTE — MR AVS SNAPSHOT
After Visit Summary   5/11/2018    Oksana Jerez    MRN: [id number]           Patient Information     Date Of Birth          1945        Visit Information        Provider Department      5/11/2018 11:30 AM Ian Shepard MD [facility] [place]        Today's Diagnoses     TENA (obstructive sleep apnea)    -  1      Care Instructions      Your BMI is Body mass index is 36.22 kg/(m^2).  Weight management is a personal decision.  If you are interested in exploring weight loss strategies, the following discussion covers the approaches that may be successful. Body mass index (BMI) is one way to tell whether you are at a healthy weight, overweight, or obese. It measures your weight in relation to your height.  A BMI of 18.5 to 24.9 is in the healthy range. A person with a BMI of 25 to 29.9 is considered overweight, and someone with a BMI of 30 or greater is considered obese. More than two-thirds of American adults are considered overweight or obese.  Being overweight or obese increases the risk for further weight gain. Excess weight may lead to heart disease and diabetes.  Creating and following plans for healthy eating and physical activity may help you improve your health.  Weight control is part of healthy lifestyle and includes exercise, emotional health, and healthy eating habits. Careful eating habits lifelong are the mainstay of weight control. Though there are significant health benefits from weight loss, long-term weight loss with diet alone may be very difficult to achieve- studies show long-term success with dietary management in less than 10% of people. Attaining a healthy weight may be especially difficult to achieve in those with severe obesity. In some cases, medications, devices and surgical management might be considered.  What can you do?  If you are overweight or obese and are interested in methods for weight loss, you should discuss this with your provider.     Consider  reducing daily calorie intake by 500 calories.     Keep a food journal.     Avoiding skipping meals, consider cutting portions instead.    Diet combined with exercise helps maintain muscle while optimizing fat loss. Strength training is particularly important for building and maintaining muscle mass. Exercise helps reduce stress, increase energy, and improves fitness. Increasing exercise without diet control, however, may not burn enough calories to loose weight.       Start walking three days a week 10-20 minutes at a time    Work towards walking thirty minutes five days a week     Eventually, increase the speed of your walking for 1-2 minutes at time    In addition, we recommend that you review healthy lifestyles and methods for weight loss available through the National Institutes of Health patient information sites:  http://win.niddk.nih.gov/publications/index.htm    And look into health and wellness programs that may be available through your health insurance provider, employer, local community center, or estephanie club.    Weight management plan: Patient was referred to their PCP to discuss a diet and exercise plan.              Follow-ups after your visit        Follow-up notes from your care team     Return in about 4 weeks (around 6/8/2018).      Your next 10 appointments already scheduled     Jun 01, 2018 11:00 AM CDT   PHYSICAL with Erica Olvera,    [facility]    [address] [id number] [phone]              Who to contact     If you have questions or need follow up information about today's clinic visit or your schedule please contact [facility] directly at [phone].  Normal or non-critical lab and imaging results will be communicated to you by MyChart, letter or phone within 4 business days after the clinic has received the results. If you do not hear from us within 7 days, please contact the clinic through [employer] or  "phone. If you have a critical or abnormal lab result, we will notify you by phone as soon as possible.  Submit refill requests through [employer] or call your pharmacy and they will forward the refill request to us. Please allow 3 business days for your refill to be completed.          Additional Information About Your Visit        [employer] Information     [employer] gives you secure access to your electronic health record. If you see a primary care provider, you can also send messages to your care team and make appointments. If you have questions, please call your primary care clinic.  If you do not have a primary care provider, please call [phone] and they will assist you.        Care EveryWhere ID     This is your Care EveryWhere ID. This could be used by other organizations to access your [place] medical records  [id number]        Your Vitals Were     Pulse Respirations Height Pulse Oximetry BMI (Body Mass Index)       103 16 1.664 m (5' 5.5\") 94% 36.22 kg/m2        Blood Pressure from Last 3 Encounters:   05/11/18 (!) 135/97   05/03/18 (!) 142/94   02/21/18 116/79    Weight from Last 3 Encounters:   05/11/18 100.2 kg (221 lb)   05/03/18 100.2 kg (221 lb)   02/21/18 100.2 kg (221 lb)              We Performed the Following     Comprehensive DME        Primary Care Provider Office Phone # Fax #    Erica OlveraDO [phone] [address]  [place] [address]        Equal Access to Services     SOCORRO ROGERS AH: Hadii aad ku hadasho Soomaali, waaxda luqadaha, qaybta kaalmada adeegyada, waxay idiin hayrobertn sanaz cardona . So [facility] [phone].    ATENCIÓN: Si habla español, tiene a barahona disposición servicios gratuitos de asistencia lingüística. Llame al [phone].    We comply with applicable federal civil rights laws and [place] laws. We do not discriminate on the basis of race, color, national origin, age, disability, sex, sexual orientation, or gender identity.          "   Thank you!     Thank you for choosing [facility]  for your care. Our goal is always to provide you with excellent care. Hearing back from our patients is one way we can continue to improve our services. Please take a few minutes to complete the written survey that you may receive in the mail after your visit with us. Thank you!             Your Updated Medication List - Protect others around you: Learn how to safely use, store and throw away your medicines at www.disposemymeds.org.          This list is accurate as of 5/11/18 11:42 AM.  Always use your most recent med list.                   Brand Name Dispense Instructions for use Diagnosis    albuterol 108 (90 Base) MCG/ACT Inhaler    VENTOLIN HFA    3 Inhaler    Inhale 2 puffs into the lungs every 6 hours as needed for shortness of breath / dyspnea or wheezing    Mild intermittent asthma without complication       allopurinol 100 MG tablet    ZYLOPRIM    90 tablet    TAKE 1 TABLET DAILY    Gout of ankle, unspecified cause, unspecified chronicity, unspecified laterality, Benign hypertension with CKD (chronic kidney disease) stage III       ascorbic acid 500 MG tablet    VITAMIN C     1 TABLET TWICE DAILY        aspirin 81 MG tablet     0    ONE DAILY or as needed    Essential hypertension, benign       CALTRATE 600 + D 600-200 MG-IU Tabs     0    1 TABLET DAILY    Routine general medical examination at a health care facility       esomeprazole 20 MG CR capsule    nexIUM    90 capsule    Take 1 capsule (20 mg) by mouth every morning (before breakfast)    Gastroesophageal reflux disease without esophagitis       fluticasone 110 MCG/ACT Inhaler    FLOVENT HFA    3 Inhaler    Inhale 1 puff into the lungs 2 times daily    Mild intermittent asthma without complication       losartan 25 MG tablet    COZAAR    90 tablet    TAKE 1 TABLET DAILY    Gout of ankle, unspecified cause, unspecified chronicity, unspecified laterality, Benign hypertension with  CKD (chronic kidney disease) stage III       mometasone 50 MCG/ACT spray    NASONEX     Spray 2 sprays into both nostrils daily        Multi-vitamin Tabs tablet   Generic drug:  multivitamin, therapeutic with minerals      QD        ranitidine 150 MG capsule    ZANTAC     Take 1 capsule by mouth 2 times daily.    Esophageal reflux       simvastatin 40 MG tablet    ZOCOR    90 tablet    Take 1 tablet (40 mg) by mouth At Bedtime    Hyperlipidemia LDL goal <100       ZYRTEC 10 MG tablet   Generic drug:  cetirizine     0    ONE TABLET DAILY    Allergic rhinitis, cause unspecified

## 2018-05-11 NOTE — TELEPHONE ENCOUNTER
Called patient, scheduled new CPAP setup at the [facility] location on 5/18/18 at 10:30am.

## 2018-05-18 ENCOUNTER — DOCUMENTATION ONLY (OUTPATIENT)
Dept: SLEEP MEDICINE | Facility: CLINIC | Age: 73
End: 2018-05-18

## 2018-05-18 DIAGNOSIS — G47.33 OSA (OBSTRUCTIVE SLEEP APNEA): Primary | ICD-10-CM

## 2018-05-18 NOTE — PROGRESS NOTES
Patient was offered choice of vendor and chose [facility].  Patient Oksana Jerez was set up at [facility] on May 18, 2018. Patient received a [employer] DreamStation Auto. Pressures were set at 5-15 cm H2O.   Patient s ramp is 5 cm H2O for Auto and FLEX/EPR is A Flex, 2.  Patient received a Resmed Mask name: AIRFIT F20  Full Face mask Size Medium, heated tubing and heated humidifier.  Patient is enrolled in the STM Program and does need to meet compliance. Patient has a follow up on 7/6/18 with Dr. Shepard.    Starr Solano

## 2018-05-21 ENCOUNTER — DOCUMENTATION ONLY (OUTPATIENT)
Dept: SLEEP MEDICINE | Facility: CLINIC | Age: 73
End: 2018-05-21

## 2018-05-21 DIAGNOSIS — G47.33 OSA (OBSTRUCTIVE SLEEP APNEA): ICD-10-CM

## 2018-05-21 NOTE — PROGRESS NOTES
3 DAY STM VISIT    Diagnostic AHI: 34.8       Patient contacted for 3 day STM visit  Message left for patient to return call     Device type: Auto-CPAP  PAP settings from order::  CPAP min 5 cm  H20       CPAP max 15 cm  H20          Mask type:    Full Face Mask   Device settings from machine      Min CPAP 5            Max CPAP 15      Assessment: most nights over four hours.   Action plan: Pt to have f/u 14 day STM visit.

## 2018-05-22 NOTE — PROGRESS NOTES
Patient returned call.     Subjective measures:  Patient meeting subjective benchmarks. She is struggling a bit with mask leak and she feels that this is improving.      Action plan:pt to have 14 day STM visit.

## 2018-06-01 ENCOUNTER — HOSPITAL ENCOUNTER (OUTPATIENT)
Dept: MAMMOGRAPHY | Facility: CLINIC | Age: 73
Discharge: HOME | End: 2018-06-01
Attending: INTERNAL MEDICINE | Admitting: INTERNAL MEDICINE
Payer: MEDICARE

## 2018-06-01 ENCOUNTER — OFFICE VISIT (OUTPATIENT)
Dept: FAMILY MEDICINE | Facility: CLINIC | Age: 73
End: 2018-06-01
Payer: COMMERCIAL

## 2018-06-01 VITALS
WEIGHT: 219 LBS | OXYGEN SATURATION: 95 % | SYSTOLIC BLOOD PRESSURE: 134 MMHG | DIASTOLIC BLOOD PRESSURE: 87 MMHG | HEIGHT: 65 IN | HEART RATE: 92 BPM | TEMPERATURE: 97 F | BODY MASS INDEX: 36.49 KG/M2

## 2018-06-01 DIAGNOSIS — G47.33 OSA (OBSTRUCTIVE SLEEP APNEA): ICD-10-CM

## 2018-06-01 DIAGNOSIS — E78.5 HYPERLIPIDEMIA LDL GOAL <100: Chronic | ICD-10-CM

## 2018-06-01 DIAGNOSIS — J45.20 MILD INTERMITTENT ASTHMA WITHOUT COMPLICATION: Chronic | ICD-10-CM

## 2018-06-01 DIAGNOSIS — M10.9 GOUT OF ANKLE, UNSPECIFIED CAUSE, UNSPECIFIED CHRONICITY, UNSPECIFIED LATERALITY: Chronic | ICD-10-CM

## 2018-06-01 DIAGNOSIS — Z00.00 MEDICARE ANNUAL WELLNESS VISIT, SUBSEQUENT: Primary | ICD-10-CM

## 2018-06-01 DIAGNOSIS — E66.01 MORBID OBESITY (H): ICD-10-CM

## 2018-06-01 DIAGNOSIS — Z12.31 VISIT FOR SCREENING MAMMOGRAM: ICD-10-CM

## 2018-06-01 DIAGNOSIS — I10 ESSENTIAL HYPERTENSION, BENIGN: Chronic | ICD-10-CM

## 2018-06-01 DIAGNOSIS — I12.9 BENIGN HYPERTENSION WITH CKD (CHRONIC KIDNEY DISEASE) STAGE III (H): Chronic | ICD-10-CM

## 2018-06-01 DIAGNOSIS — N18.30 BENIGN HYPERTENSION WITH CKD (CHRONIC KIDNEY DISEASE) STAGE III (H): Chronic | ICD-10-CM

## 2018-06-01 DIAGNOSIS — K21.9 GASTROESOPHAGEAL REFLUX DISEASE, ESOPHAGITIS PRESENCE NOT SPECIFIED: Chronic | ICD-10-CM

## 2018-06-01 PROBLEM — E66.9 NON MORBID OBESITY, UNSPECIFIED OBESITY TYPE: Chronic | Status: RESOLVED | Noted: 2017-05-29 | Resolved: 2018-06-01

## 2018-06-01 LAB
ERYTHROCYTE [DISTWIDTH] IN BLOOD BY AUTOMATED COUNT: 13 % (ref 10–15)
HCT VFR BLD AUTO: 41.6 % (ref 35–47)
HGB BLD-MCNC: 13.5 G/DL (ref 11.7–15.7)
MCH RBC QN AUTO: 31 PG (ref 26.5–33)
MCHC RBC AUTO-ENTMCNC: 32.5 G/DL (ref 31.5–36.5)
MCV RBC AUTO: 96 FL (ref 78–100)
PLATELET # BLD AUTO: 315 10E9/L (ref 150–450)
RBC # BLD AUTO: 4.35 10E12/L (ref 3.8–5.2)
WBC # BLD AUTO: 8 10E9/L (ref 4–11)

## 2018-06-01 PROCEDURE — 80061 LIPID PANEL: CPT | Performed by: INTERNAL MEDICINE

## 2018-06-01 PROCEDURE — 77067 SCR MAMMO BI INCL CAD: CPT

## 2018-06-01 PROCEDURE — 85027 COMPLETE CBC AUTOMATED: CPT | Performed by: INTERNAL MEDICINE

## 2018-06-01 PROCEDURE — 99397 PER PM REEVAL EST PAT 65+ YR: CPT | Performed by: INTERNAL MEDICINE

## 2018-06-01 PROCEDURE — 36415 COLL VENOUS BLD VENIPUNCTURE: CPT | Performed by: INTERNAL MEDICINE

## 2018-06-01 PROCEDURE — 84550 ASSAY OF BLOOD/URIC ACID: CPT | Performed by: INTERNAL MEDICINE

## 2018-06-01 PROCEDURE — 80053 COMPREHEN METABOLIC PANEL: CPT | Performed by: INTERNAL MEDICINE

## 2018-06-01 RX ORDER — ALLOPURINOL 100 MG/1
100 TABLET ORAL DAILY
Qty: 90 TABLET | Refills: 3 | Status: SHIPPED | OUTPATIENT
Start: 2018-06-01 | End: 2019-06-18

## 2018-06-01 RX ORDER — SIMVASTATIN 40 MG
40 TABLET ORAL AT BEDTIME
Qty: 90 TABLET | Refills: 3 | Status: SHIPPED | OUTPATIENT
Start: 2018-06-01 | End: 2019-06-18

## 2018-06-01 RX ORDER — FLUTICASONE PROPIONATE 110 UG/1
1 AEROSOL, METERED RESPIRATORY (INHALATION) 2 TIMES DAILY
Qty: 3 INHALER | Refills: 3 | Status: SHIPPED | OUTPATIENT
Start: 2018-06-01 | End: 2019-06-18

## 2018-06-01 RX ORDER — LOSARTAN POTASSIUM 25 MG/1
25 TABLET ORAL DAILY
Qty: 90 TABLET | Refills: 3 | Status: SHIPPED | OUTPATIENT
Start: 2018-06-01 | End: 2019-06-18

## 2018-06-01 NOTE — LETTER
My Asthma Action Plan  Name: Oksana Jerez   YOB: 1945  Date: 6/1/2018   My doctor: Erica Olvera, DO   My clinic: [facility]        My Control Medicine: Flovent  My Rescue Medicine: Albuterol   My Asthma Severity: mild persistent              GREEN ZONE   Good Control    I feel good    No cough or wheeze    Can work, sleep and play without asthma symptoms       Take your asthma control medicine every day.     1. If exercise triggers your asthma, take your rescue medication    15 minutes before exercise or sports, and    During exercise if you have asthma symptoms  2. Spacer to use with inhaler: If you have a spacer, make sure to use it with your inhaler             YELLOW ZONE Getting Worse  I have ANY of these:    I do not feel good    Cough or wheeze    Chest feels tight    Wake up at night   1. Keep taking your Green Zone medications  2. Start taking your rescue medicine:    every 20 minutes for up to 1 hour. Then every 4 hours for 24-48 hours.  3. If you stay in the Yellow Zone for more than 12-24 hours, contact your doctor.  4. If you do not return to the Green Zone in 12-24 hours or you get worse, start taking your oral steroid medicine if prescribed by your provider.           RED ZONE Medical Alert - Get Help  I have ANY of these:    I feel awful    Medicine is not helping    Breathing getting harder    Trouble walking or talking    Nose opens wide to breathe       1. Take your rescue medicine NOW  2. If your provider has prescribed an oral steroid medicine, start taking it NOW  3. Call your doctor NOW  4. If you are still in the Red Zone after 20 minutes and you have not reached your doctor:    Take your rescue medicine again and    Call 911 or go to the emergency room right away    See your regular doctor within 2 weeks of an Emergency Room or Urgent Care visit for follow-up treatment.          Annual Reminders:  Meet with Asthma Educator,  Flu Shot in the Fall, consider  Pneumonia Vaccination for patients with asthma (aged 19 and older).    [facility] [place] - [address] AT [address] TO REG[address]ED [facility] [address]                      Asthma Triggers  How To Control Things That Make Your Asthma Worse    Triggers are things that make your asthma worse.  Look at the list below to help you find your triggers and what you can do about them.  You can help prevent asthma flare-ups by staying away from your triggers.      Trigger                                                          What you can do   Cigarette Smoke  Tobacco smoke can make asthma worse. Do not allow smoking in your home, car or around you.  Be sure no one smokes at a child s day care or school.  If you smoke, ask your health care provider for ways to help you quit.  Ask family members to quit too.  Ask your health care provider for a referral to Quit Plan to help you quit smoking, or call 1-664-147-PLAN.     Colds, Flu, Bronchitis  These are common triggers of asthma. Wash your hands often.  Don t touch your eyes, nose or mouth.  Get a flu shot every year.     Dust Mites  These are tiny bugs that live in cloth or carpet. They are too small to see. Wash sheets and blankets in hot water every week.   Encase pillows and mattress in dust mite proof covers.  Avoid having carpet if you can. If you have carpet, vacuum weekly.   Use a dust mask and HEPA vacuum.   Pollen and Outdoor Mold  Some people are allergic to trees, grass, or weed pollen, or molds. Try to keep your windows closed.  Limit time out doors when pollen count is high.   Ask you health care provider about taking medicine during allergy season.     Animal Dander  Some people are allergic to skin flakes, urine or saliva from pets with fur or feathers. Keep pets with fur or feathers out of your home.    If you can t keep the pet outdoors, then keep the pet out of your bedroom.  Keep the bedroom door closed.  Keep pets off  cloth furniture and away from stuffed toys.     Mice, Rats, and Cockroaches  Some people are allergic to the waste from these pests.   Cover food and garbage.  Clean up spills and food crumbs.  Store grease in the refrigerator.   Keep food out of the bedroom.   Indoor Mold  This can be a trigger if your home has high moisture. Fix leaking faucets, pipes, or other sources of water.   Clean moldy surfaces.  Dehumidify basement if it is damp and smelly.   Smoke, Strong Odors, and Sprays  These can reduce air quality. Stay away from strong odors and sprays, such as perfume, powder, hair spray, paints, smoke incense, paint, cleaning products, candles and new carpet.   Exercise or Sports  Some people with asthma have this trigger. Be active!  Ask your doctor about taking medicine before sports or exercise to prevent symptoms.    Warm up for 5-10 minutes before and after sports or exercise.     Other Triggers of Asthma  Cold air:  Cover your nose and mouth with a scarf.  Sometimes laughing or crying can be a trigger.  Some medicines and food can trigger asthma.

## 2018-06-01 NOTE — MR AVS SNAPSHOT
After Visit Summary   6/1/2018    Oksana Jerez    MRN: [id number]           Patient Information     Date Of Birth          1945        Visit Information        Provider Department      6/1/2018 11:00 AM Erica Olvera DO [facility]        Today's Diagnoses     Medicare annual wellness visit, subsequent    -  1    Hyperlipidemia        BENIGN HYPERTENSION; goal <140/90        Benign hypertension with CKD (chronic kidney disease) stage III        Mild intermittent asthma without complication        Gout of ankle, unspecified cause, unspecified chronicity, unspecified laterality        TENA (obstructive sleep apnea)        Visit for screening mammogram          Care Instructions    Labs today  [facility]: [phone] in [address] downstairs  Keep up the excellent work!  You could try to reduce your Nexium now that you use a CPAP  Follow up at least annually or sooner if needed    Preventive Health Recommendations  Female Ages 65 +    Yearly exam:     See your health care provider every year in order to  o Review health changes.   o Discuss preventive care.    o Review your medicines if your doctor has prescribed any.      You no longer need a yearly Pap test unless you've had an abnormal Pap test in the past 10 years. If you have vaginal symptoms, such as bleeding or discharge, be sure to talk with your provider about a Pap test.      Every 1 to 2 years, have a mammogram.  If you are over 69, talk with your health care provider about whether or not you want to continue having screening mammograms.      Every 10 years, have a colonoscopy. Or, have a yearly FIT test (stool test). These exams will check for colon cancer.       Have a cholesterol test every 5 years, or more often if your doctor advises it.       Have a diabetes test (fasting glucose) every three years. If you are at risk for diabetes, you should have this test more often.       At age 65,  have a bone density scan (DEXA) to check for osteoporosis (brittle bone disease).    Shots:    Get a flu shot each year.    Get a tetanus shot every 10 years.    Talk to your doctor about your pneumonia vaccines. There are now two you should receive - Pneumovax (PPSV 23) and Prevnar (PCV 13).    Talk to your doctor about the shingles vaccine.    Talk to your doctor about the hepatitis B vaccine.    Nutrition:     Eat at least 5 servings of fruits and vegetables each day.      Eat whole-grain bread, whole-wheat pasta and brown rice instead of white grains and rice.      Talk to your provider about Calcium and Vitamin D.     Lifestyle    Exercise at least 150 minutes a week (30 minutes a day, 5 days a week). This will help you control your weight and prevent disease.      Limit alcohol to one drink per day.      No smoking.       Wear sunscreen to prevent skin cancer.       See your dentist twice a year for an exam and cleaning.      See your eye doctor every 1 to 2 years to screen for conditions such as glaucoma, macular degeneration and cataracts.          Follow-ups after your visit        Your next 10 appointments already scheduled     Jul 06, 2018 11:00 AM CDT   Return Sleep Patient with Ian Shepard MD   [facility] ([facility])    [address] [place] [id number] [phone]              Future tests that were ordered for you today     Open Future Orders        Priority Expected Expires Ordered    MA Screen Bilateral w/Jim Routine  6/1/2019 6/1/2018            Who to contact     If you have questions or need follow up information about today's clinic visit or your schedule please contact [facility] directly at [phone].  Normal or non-critical lab and imaging results will be communicated to you by MyChart, letter or phone within 4 business days after the clinic has received the results. If you do not hear from us within 7 days,  "please contact the clinic through [employer] or phone. If you have a critical or abnormal lab result, we will notify you by phone as soon as possible.  Submit refill requests through [employer] or call your pharmacy and they will forward the refill request to us. Please allow 3 business days for your refill to be completed.          Additional Information About Your Visit        [employer] Information     [employer] gives you secure access to your electronic health record. If you see a primary care provider, you can also send messages to your care team and make appointments. If you have questions, please call your primary care clinic.  If you do not have a primary care provider, please call [phone] and they will assist you.        Care EveryWhere ID     This is your Care EveryWhere ID. This could be used by other organizations to access your [facility] medical records  [id number]        Your Vitals Were     Pulse Temperature Height Pulse Oximetry Breastfeeding? BMI (Body Mass Index)    92 97  F (36.1  C) (Tympanic) 5' 4.5\" (1.638 m) 95% No 37.01 kg/m2       Blood Pressure from Last 3 Encounters:   06/01/18 134/87   05/11/18 (!) 135/97   05/03/18 (!) 142/94    Weight from Last 3 Encounters:   06/01/18 219 lb (99.3 kg)   05/11/18 221 lb (100.2 kg)   05/03/18 221 lb (100.2 kg)              We Performed the Following     CBC with platelets     Comprehensive metabolic panel     Lipid panel reflex to direct LDL Fasting     Uric acid          Today's Medication Changes          These changes are accurate as of 6/1/18 11:53 AM.  If you have any questions, ask your nurse or doctor.               These medicines have changed or have updated prescriptions.        Dose/Directions    allopurinol 100 MG tablet   Commonly known as:  ZYLOPRIM   This may have changed:  See the new instructions.   Used for:  Gout of ankle, unspecified cause, unspecified chronicity, unspecified laterality   Changed by:  Erica Olvera, DO        Dose:  " 100 mg   Take 1 tablet (100 mg) by mouth daily   Quantity:  90 tablet   Refills:  3       losartan 25 MG tablet   Commonly known as:  COZAAR   This may have changed:  See the new instructions.   Used for:  Benign hypertension with CKD (chronic kidney disease) stage III   Changed by:  Erica Olvera DO        Dose:  25 mg   Take 1 tablet (25 mg) by mouth daily   Quantity:  90 tablet   Refills:  3            Where to get your medicines      These medications were sent to [facility] - [place], [place] - [address] AT [address] to [address], [facility] [address]     Phone:  [phone]     allopurinol 100 MG tablet    fluticasone 110 MCG/ACT Inhaler    losartan 25 MG tablet    simvastatin 40 MG tablet                Primary Care Provider Office Phone # Fax #    Erica Olvera [phone] [address] [facility] [address] [address]        Equal Access to Services     [facility]: Hadii aad ku hadasho Soomaali, waaxda luqadaha, qaybta kaalmada adeegyada, waxay idiin hayaan sanaz pettyarasumit cardona . So [facility] [phone].    ATENCIÓN: Si habla español, tiene a barahona disposición servicios gratuitos de asistencia lingüística. [facility] [phone].    We comply with applicable federal civil rights laws and [place] laws. We do not discriminate on the basis of race, color, national origin, age, disability, sex, sexual orientation, or gender identity.            Thank you!     Thank you for choosing [facility]  for your care. Our goal is always to provide you with excellent care. Hearing back from our patients is one way we can continue to improve our services. Please take a few minutes to complete the written survey that you may receive in the mail after your visit with us. Thank you!             Your Updated Medication List - Protect others around you: Learn how to safely use, store and throw away your medicines at  www.disposemymeds.org.          This list is accurate as of 6/1/18 11:53 AM.  Always use your most recent med list.                   Brand Name Dispense Instructions for use Diagnosis    albuterol 108 (90 Base) MCG/ACT Inhaler    VENTOLIN HFA    3 Inhaler    Inhale 2 puffs into the lungs every 6 hours as needed for shortness of breath / dyspnea or wheezing    Mild intermittent asthma without complication       allopurinol 100 MG tablet    ZYLOPRIM    90 tablet    Take 1 tablet (100 mg) by mouth daily    Gout of ankle, unspecified cause, unspecified chronicity, unspecified laterality       ascorbic acid 500 MG tablet    VITAMIN C     1 TABLET TWICE DAILY        aspirin 81 MG tablet     0    ONE DAILY or as needed    Essential hypertension, benign       CALTRATE 600 + D 600-200 MG-IU Tabs     0    1 TABLET DAILY    Routine general medical examination at a health care facility       fluticasone 110 MCG/ACT Inhaler    FLOVENT HFA    3 Inhaler    Inhale 1 puff into the lungs 2 times daily    Mild intermittent asthma without complication       losartan 25 MG tablet    COZAAR    90 tablet    Take 1 tablet (25 mg) by mouth daily    Benign hypertension with CKD (chronic kidney disease) stage III       mometasone 50 MCG/ACT spray    NASONEX     Spray 2 sprays into both nostrils daily        Multi-vitamin Tabs tablet   Generic drug:  multivitamin, therapeutic with minerals      QD        NEXIUM PO      Take 20 mg by mouth every morning (before breakfast)        order for DME      Equipment being ordered: CPAP        ranitidine 150 MG capsule    ZANTAC     Take 1 capsule by mouth 2 times daily.    Esophageal reflux       simvastatin 40 MG tablet    ZOCOR    90 tablet    Take 1 tablet (40 mg) by mouth At Bedtime    Hyperlipidemia LDL goal <100       ZYRTEC 10 MG tablet   Generic drug:  cetirizine     0    ONE TABLET DAILY    Allergic rhinitis, cause unspecified

## 2018-06-01 NOTE — PROGRESS NOTES
SUBJECTIVE:   Oksana Jerez is a 72 year old female who presents for Preventive Visit.  Are you in the first 12 months of your Medicare Part B coverage?  No    Healthy Habits:    Do you get at least three servings of calcium containing foods daily (dairy, green leafy vegetables, etc.)? Yes     Amount of exercise or daily activities, outside of work: 3-5 day(s) per week [employer] with      Problems taking medications regularly No    Medication side effects: No    Have you had an eye exam in the past two years? Yes     Do you see a dentist twice per year? Yes     Do you have sleep apnea, excessive snoring or daytime drowsiness? Yes, has new CPAP machine      Ability to successfully perform activities of daily living: Yes, no assistance needed    Home safety:  none identified     Hearing impairment: right ear still seems a bit plugged , had fluid behind it    Fall risk:  Fallen 2 or more times in the past year?: No  Any fall with injury in the past year?: No     New dx TENA recently and getting used to CPAP now  R ear feels plugged some but doesn't hurt; uses Nasonex nasal spray too  Asthma under good control  No gout flare ups at all  Has some weddings to go to this summer and will be traveling a few hours this weekend to visit her sister to celebrate aunt's 90th birthday  Nexium cheaper over-the-counter and working well to control GERD; tried to stop it but did develop GERD again; Takes Zantac as well  Joined [employer] and loves it!    ACT Total Scores 4/29/2016 5/22/2017 5/3/2018   ACT TOTAL SCORE - - -   ASTHMA ER VISITS - - -   ASTHMA HOSPITALIZATIONS - - -   ACT TOTAL SCORE (Goal Greater than or Equal to 20) 20 21 20   In the past 12 months, how many times did you visit the emergency room for your asthma without being admitted to the hospital? 0 0 0   In the past 12 months, how many times were you hospitalized overnight because of your asthma? 0 0 0         Problems        Social History  "  Substance Use Topics     Smoking status: Former Smoker     Quit date: 1/1/1974     Smokeless tobacco: Never Used     Alcohol use 0.0 oz/week     0 Standard drinks or equivalent per week      Comment: 1-2 drinks weekly       If you drink alcohol do you typically have >3 drinks per day or >7 drinks per week? No                        Today's PHQ-2 Score:   PHQ-2 ( 1999 [employer]) 6/1/2018 5/3/2018   Q1: Little interest or pleasure in doing things 0 0   Q2: Feeling down, depressed or hopeless 0 0   PHQ-2 Score 0 0   Q1: Little interest or pleasure in doing things - -   Q2: Feeling down, depressed or hopeless - -   PHQ-2 Score - -       Do you feel safe in your environment - Yes    Do you have a Health Care Directive?: Yes: Patient states has Advance Directive and will bring in a copy to clinic.    Current providers sharing in care for this patient include:   Patient Care Team:  Erica Olvera,  as PCP - General (Internal Medicine)    The following health maintenance items are reviewed in Epic and correct as of today:  Health Maintenance   Topic Date Due     INFLUENZA VACCINE (Season Ended) 09/01/2018     ASTHMA CONTROL TEST Q6 MOS  11/03/2018     ASTHMA ACTION PLAN Q1 YR  06/01/2019     FALL RISK ASSESSMENT  06/01/2019     ADVANCE DIRECTIVE PLANNING Q5 YRS  10/22/2019     MAMMO SCREEN Q2 YR (SYSTEM ASSIGNED)  06/01/2020     LIPID SCREEN Q5 YR FEMALE (SYSTEM ASSIGNED)  06/01/2023     COLONOSCOPY Q10 YR  08/28/2024     TETANUS IMMUNIZATION (SYSTEM ASSIGNED)  01/16/2025     DEXA SCAN SCREENING (SYSTEM ASSIGNED)  Completed     PNEUMOCOCCAL  Completed       ROS:  Comprehensive ROS negative unless as stated above in HPI.     OBJECTIVE:   /87 (Cuff Size: Adult Large)  Pulse 92  Temp 97  F (36.1  C) (Tympanic)  Ht 5' 4.5\" (1.638 m)  Wt 219 lb (99.3 kg)  SpO2 95%  Breastfeeding? No  BMI 37.01 kg/m2 Estimated body mass index is 37.01 kg/(m^2) as calculated from the following:    Height as of this encounter: 5' " "4.5\" (1.638 m).    Weight as of this encounter: 219 lb (99.3 kg).  EXAM:   GENERAL APPEARANCE: healthy, alert and no distress, obese but cheerful, healthy appearing and robust  EYES: Eyes grossly normal to inspection, PERRL and conjunctivae and sclerae normal  HENT: oropharynx clear, oral mucous membranes moist and minor nasal congestion with minor bulge of right TM but clear TM without erythema, hears well in casual conversation  NECK: no adenopathy, no asymmetry, masses, or scars and thyroid normal to palpation  RESP: lungs clear to auscultation - no rales, rhonchi or wheezes  BREAST: normal without masses, tenderness or nipple discharge and no palpable axillary masses or adenopathy  CV: regular rate and rhythm, normal S1 S2, no S3 or S4, no murmur, click or rub, no peripheral edema and no carotid bruits  ABDOMEN: soft, nontender, no hepatosplenomegaly, no masses and bowel sounds normal  MS: no musculoskeletal defects are noted and gait is age appropriate without ataxia  SKIN: no suspicious lesions or rashes  NEURO: Normal strength and tone, mentation intact and speech normal  PSYCH: mentation appears normal and affect normal/bright    Wt Readings from Last 4 Encounters:   06/01/18 219 lb (99.3 kg)   05/11/18 221 lb (100.2 kg)   05/03/18 221 lb (100.2 kg)   02/21/18 221 lb (100.2 kg)       ASSESSMENT / PLAN:   1. Medicare annual wellness visit, subsequent  Oksana is doing quite well - glad she joined [employer] to try and improve her activity level and gain strength/balance and help with some weight loss  She is staying active socially as well    2. Hyperlipidemia  Due for labs  - Lipid panel reflex to direct LDL Fasting  - simvastatin (ZOCOR) 40 MG tablet; Take 1 tablet (40 mg) by mouth At Bedtime  Dispense: 90 tablet; Refill: 3    3. BENIGN HYPERTENSION; goal <140/90 with CKD (chronic kidney disease) stage III  At goal  - CBC with platelets  - Comprehensive metabolic panel  - losartan (COZAAR) 25 MG tablet; " "Take 1 tablet (25 mg) by mouth daily  Dispense: 90 tablet; Refill: 3    4. GERD  Advised trying to taper off of Nexium now that she is treating her TENA as that sometimes helps to reduce GERD  She also takes Zantac    5. Mild intermittent asthma without complication  Well controlled  - fluticasone (FLOVENT HFA) 110 MCG/ACT Inhaler; Inhale 1 puff into the lungs 2 times daily  Dispense: 3 Inhaler; Refill: 3    6. Gout of ankle, unspecified cause, unspecified chronicity, unspecified laterality  No gout flares on allopurinol  - Uric acid  - allopurinol (ZYLOPRIM) 100 MG tablet; Take 1 tablet (100 mg) by mouth daily  Dispense: 90 tablet; Refill: 3    7. Morbid obesity (H)  She joined [employer]    8. TENA (obstructive sleep apnea)  Congratulated her on getting this treatment started and that she is sticking with it    9. Visit for screening mammogram  - MA Screen Bilateral w/Jim; Future    End of Life Planning:  Patient currently has an advanced directive: Yes, but not on file with us - she will bring in    COUNSELING:  Reviewed preventive health counseling, as reflected in patient instructions       Regular exercise       Healthy diet/nutrition  Estimated body mass index is 37.01 kg/(m^2) as calculated from the following:    Height as of this encounter: 5' 4.5\" (1.638 m).    Weight as of this encounter: 219 lb (99.3 kg).  Weight management plan: Discussed healthy diet and exercise guidelines and patient will follow up in 12 months in clinic to re-evaluate.   reports that she quit smoking about 44 years ago. She has never used smokeless tobacco.      Appropriate preventive services were discussed with this patient, including applicable screening as appropriate for cardiovascular disease, diabetes, osteopenia/osteoporosis, and glaucoma.  As appropriate for age/gender, discussed screening for colorectal cancer, prostate cancer, breast cancer, and cervical cancer. Checklist reviewing preventive services available has been " given to the patient.    Reviewed patients plan of care and provided an AVS. The Intermediate Care Plan ( asthma action plan, low back pain action plan, and migraine action plan) for Oksana meets the Care Plan requirement. This Care Plan has been established and reviewed with the Patient.    Patient Instructions   Labs today  [facility]: [phone] in [address] downstairs  Keep up the excellent work!  You could try to reduce your Nexium now that you use a CPAP  Follow up at least annually or sooner if needed    Erica Olvera, DO  [facility]

## 2018-06-01 NOTE — PATIENT INSTRUCTIONS
Labs today  [facility]: [phone] in [address] downstairs  Keep up the excellent work!  You could try to reduce your Nexium now that you use a CPAP  Follow up at least annually or sooner if needed    Preventive Health Recommendations  Female Ages 65 +    Yearly exam:     See your health care provider every year in order to  o Review health changes.   o Discuss preventive care.    o Review your medicines if your doctor has prescribed any.      You no longer need a yearly Pap test unless you've had an abnormal Pap test in the past 10 years. If you have vaginal symptoms, such as bleeding or discharge, be sure to talk with your provider about a Pap test.      Every 1 to 2 years, have a mammogram.  If you are over 69, talk with your health care provider about whether or not you want to continue having screening mammograms.      Every 10 years, have a colonoscopy. Or, have a yearly FIT test (stool test). These exams will check for colon cancer.       Have a cholesterol test every 5 years, or more often if your doctor advises it.       Have a diabetes test (fasting glucose) every three years. If you are at risk for diabetes, you should have this test more often.       At age 65, have a bone density scan (DEXA) to check for osteoporosis (brittle bone disease).    Shots:    Get a flu shot each year.    Get a tetanus shot every 10 years.    Talk to your doctor about your pneumonia vaccines. There are now two you should receive - Pneumovax (PPSV 23) and Prevnar (PCV 13).    Talk to your doctor about the shingles vaccine.    Talk to your doctor about the hepatitis B vaccine.    Nutrition:     Eat at least 5 servings of fruits and vegetables each day.      Eat whole-grain bread, whole-wheat pasta and brown rice instead of white grains and rice.      Talk to your provider about Calcium and Vitamin D.     Lifestyle    Exercise at least 150 minutes a week (30 minutes a day, 5 days a week). This will help  you control your weight and prevent disease.      Limit alcohol to one drink per day.      No smoking.       Wear sunscreen to prevent skin cancer.       See your dentist twice a year for an exam and cleaning.      See your eye doctor every 1 to 2 years to screen for conditions such as glaucoma, macular degeneration and cataracts.

## 2018-06-02 LAB
ALBUMIN SERPL-MCNC: 3.9 G/DL (ref 3.4–5)
ALP SERPL-CCNC: 116 U/L (ref 40–150)
ALT SERPL W P-5'-P-CCNC: 44 U/L (ref 0–50)
ANION GAP SERPL CALCULATED.3IONS-SCNC: 9 MMOL/L (ref 3–14)
AST SERPL W P-5'-P-CCNC: 32 U/L (ref 0–45)
BILIRUB SERPL-MCNC: 0.6 MG/DL (ref 0.2–1.3)
BUN SERPL-MCNC: 21 MG/DL (ref 7–30)
CALCIUM SERPL-MCNC: 9.1 MG/DL (ref 8.5–10.1)
CHLORIDE SERPL-SCNC: 105 MMOL/L (ref 94–109)
CHOLEST SERPL-MCNC: 147 MG/DL
CO2 SERPL-SCNC: 26 MMOL/L (ref 20–32)
CREAT SERPL-MCNC: 1.03 MG/DL (ref 0.52–1.04)
GFR SERPL CREATININE-BSD FRML MDRD: 52 ML/MIN/1.7M2
GLUCOSE SERPL-MCNC: 104 MG/DL (ref 70–99)
HDLC SERPL-MCNC: 46 MG/DL
LDLC SERPL CALC-MCNC: 68 MG/DL
NONHDLC SERPL-MCNC: 101 MG/DL
POTASSIUM SERPL-SCNC: 4.7 MMOL/L (ref 3.4–5.3)
PROT SERPL-MCNC: 7.7 G/DL (ref 6.8–8.8)
SODIUM SERPL-SCNC: 140 MMOL/L (ref 133–144)
TRIGL SERPL-MCNC: 166 MG/DL
URATE SERPL-MCNC: 5.7 MG/DL (ref 2.6–6)

## 2018-06-04 ENCOUNTER — DOCUMENTATION ONLY (OUTPATIENT)
Dept: SLEEP MEDICINE | Facility: CLINIC | Age: 73
End: 2018-06-04
Payer: COMMERCIAL

## 2018-06-04 NOTE — PROGRESS NOTES
14 DAY STM VISIT    Diagnostic AHI: 34.8     Message left for patient to return call.    Assessment: Pt meeting objective benchmarks.     Action plan: Waiting for patient to return call and pt to have 30 day STM visit.   Device type: Auto-CPAP  PAP settings: CPAP min 5 cm  H20     CPAP max 15 cm  H20     90th % pressure8.7 cm  H20    Mask type:   Full Face Mask     Objective measures: 14 day rolling measures         Compliance  100 %      % of night spent in large leak  8 % last  upload      AHI 6.81   last  upload      Average number of minutes 394     Average hours of usage 6.6              Objective measure goal  Compliance   Goal >70%  Leak   Goal < 10%  AHI  Goal < 5  Usage  Goal >240

## 2018-06-05 ENCOUNTER — HOSPITAL ENCOUNTER (OUTPATIENT)
Dept: MAMMOGRAPHY | Facility: CLINIC | Age: 73
Discharge: HOME | End: 2018-06-05
Attending: INTERNAL MEDICINE | Admitting: INTERNAL MEDICINE
Payer: MEDICARE

## 2018-06-05 DIAGNOSIS — R92.8 ABNORMAL MAMMOGRAM: ICD-10-CM

## 2018-06-05 PROCEDURE — 76642 ULTRASOUND BREAST LIMITED: CPT | Mod: 50

## 2018-06-19 ENCOUNTER — DOCUMENTATION ONLY (OUTPATIENT)
Dept: SLEEP MEDICINE | Facility: CLINIC | Age: 73
End: 2018-06-19
Payer: COMMERCIAL

## 2018-06-19 NOTE — PROGRESS NOTES
30 DAY STM VISIT    Diagnostic AHI: 34.8     Message left for patient to return call.    Assessment: Pt meeting objective benchmarks AHI slightly elevated.     Action plan: Waiting for patient to return call and pt to have 6 month [facility] visit.  Patient has a follow up visit with Dr. Shepard on 7/6/2018.   Device type: Auto-CPAP  PAP settings: CPAP min 5 cm  H20     CPAP max 15 cm  H20   Mask type:  Full Face Mask  Objective measures: 14 day rolling measures      Compliance  100 %      AHI 6.49   last  upload      Average number of minutes 408      Objective measure goal  Compliance   Goal >70%  Leak   Goal < 24 lpm  AHI  Goal < 5  Usage  Goal >240

## 2018-06-21 NOTE — PROGRESS NOTES
Patient called stated she is sleeping but doesn't notice a difference in how she feels since she started using CPAP. Patient had question about leaving her machine running or shutting it off when she uses the restroom. Told patient she could keep it running.

## 2018-07-06 ENCOUNTER — OFFICE VISIT (OUTPATIENT)
Dept: SLEEP MEDICINE | Facility: CLINIC | Age: 73
End: 2018-07-06
Payer: COMMERCIAL

## 2018-07-06 VITALS
HEIGHT: 65 IN | RESPIRATION RATE: 16 BRPM | SYSTOLIC BLOOD PRESSURE: 131 MMHG | OXYGEN SATURATION: 95 % | BODY MASS INDEX: 36.79 KG/M2 | WEIGHT: 220.8 LBS | DIASTOLIC BLOOD PRESSURE: 77 MMHG | HEART RATE: 103 BPM

## 2018-07-06 DIAGNOSIS — G47.33 OSA (OBSTRUCTIVE SLEEP APNEA): ICD-10-CM

## 2018-07-06 PROCEDURE — 99213 OFFICE O/P EST LOW 20 MIN: CPT | Performed by: INTERNAL MEDICINE

## 2018-07-06 NOTE — PROGRESS NOTES
Obstructive Sleep Apnea - PAP Follow-Up Visit:    Chief Complaint   Patient presents with     CPAP Follow Up     Follow up navi        Oksana Jerez comes in today for follow-up of their severe sleep apnea, managed with CPAP.     PSG from 4/4/2018 shows an apnea hypopnea index of 34.8 per hour.     Overall, she rates the experience with PAP as 8 (0 poor, 10 great). The mask is comfortable. The mask is not leaking.  She is not snoring with the mask on. She is not having gasp arousals.  She is not having significant oral/nasal dryness. The pressure settings are comfortable.     She uses full-face mask.     Bedtime is typically 10:30 pm. Usually it takes about 30-60 minutes to fall asleep with the mask on. Wake time is typically 7 am.  Patient is using PAP therapy 6-7 hours per night. The patient is usually getting 7 hours of sleep per night.    She does feel rested in the morning.    Total score - [place]: 1 (7/6/2018 11:06 AM)      Respironics    Auto-PAP 5-15 cmH2O download:  30/30 total days of use. 0 nonuse days.  Average use 6 hours 26 minutes per day.  100% days with >4 hours use.  Large leak 2 mins per day average. CPAP 90% pressure 12.1cm. AHI 5.7          Reviewed by team: Allergies  Meds       Reviewed by provider:        Problem List:  Patient Active Problem List    Diagnosis Date Noted     Obesity; BMI 35-40 (H) 06/01/2018     Priority: Medium     NAVI (obstructive sleep apnea) 04/08/2018     Priority: Medium     Urinary incontinence, unspecified type 05/29/2017     Priority: Medium     Witnessed apneic spells 06/21/2016     Priority: Medium     Gout 04/15/2015     Priority: Medium     Advanced directives, counseling/discussion 10/22/2014     Priority: Medium     Advance Care Planning:   ACP Review and Resources Provided:  Reviewed chart for advance care plan.  Oksana Jerez has no plan or code status on file. Letter sent..   Added by Shanda Vines on 10/22/2014             Mild  "intermittent asthma 05/14/2014     Priority: Medium     Benign hypertension with CKD (chronic kidney disease) stage III 11/19/2012     Priority: Medium     CKD (chronic kidney disease) stage 3, GFR 30-59 ml/min 11/15/2010     Priority: Medium     Hyperlipidemia 10/31/2010     Priority: Medium     Esophageal reflux 07/25/2006     Priority: Medium     BENIGN HYPERTENSION; goal <140/90 02/11/2003     Priority: Medium     Allergic rhinitis 02/11/2003     Priority: Medium     Problem list name updated by automated process. Provider to review            /77  Pulse 103  Resp 16  Ht 1.638 m (5' 4.5\")  Wt 100.2 kg (220 lb 12.8 oz)  SpO2 95%  BMI 37.31 kg/m2    Impression/Plan:     Severe sleep apnea.     - Tolerating PAP well. Daytime symptoms are stable. Regular compliance and normal AHI noted on download.     Plan:    1. Continue auto PAP therapy     Oksana ERWIN Jerez will follow up in about 1 year(s).     Fifteen minutes spent with patient, all of which were spent face-to-face counseling, consulting, coordinating plan of care.      Ian Shepard MD, MD    CC:  Erica Olvera,   "

## 2018-07-06 NOTE — NURSING NOTE
"Chief Complaint   Patient presents with     CPAP Follow Up     Follow up navi        Initial /77  Pulse 103  Resp 16  Ht 1.638 m (5' 4.5\")  Wt 100.2 kg (220 lb 12.8 oz)  SpO2 95%  BMI 37.31 kg/m2 Estimated body mass index is 37.31 kg/(m^2) as calculated from the following:    Height as of this encounter: 1.638 m (5' 4.5\").    Weight as of this encounter: 100.2 kg (220 lb 12.8 oz).    Medication Reconciliation: complete    ESS 1    Radha Hussein MA      "

## 2018-07-06 NOTE — MR AVS SNAPSHOT
After Visit Summary   7/6/2018    Oksana Jerez    MRN: [id number]           Patient Information     Date Of Birth          1945        Visit Information        Provider Department      7/6/2018 11:00 AM Ian Shepard MD [facility]        Today's Diagnoses     TENA (obstructive sleep apnea)          Care Instructions      Your BMI is Body mass index is 37.31 kg/(m^2).  Weight management is a personal decision.  If you are interested in exploring weight loss strategies, the following discussion covers the approaches that may be successful. Body mass index (BMI) is one way to tell whether you are at a healthy weight, overweight, or obese. It measures your weight in relation to your height.  A BMI of 18.5 to 24.9 is in the healthy range. A person with a BMI of 25 to 29.9 is considered overweight, and someone with a BMI of 30 or greater is considered obese. More than two-thirds of American adults are considered overweight or obese.  Being overweight or obese increases the risk for further weight gain. Excess weight may lead to heart disease and diabetes.  Creating and following plans for healthy eating and physical activity may help you improve your health.  Weight control is part of healthy lifestyle and includes exercise, emotional health, and healthy eating habits. Careful eating habits lifelong are the mainstay of weight control. Though there are significant health benefits from weight loss, long-term weight loss with diet alone may be very difficult to achieve- studies show long-term success with dietary management in less than 10% of people. Attaining a healthy weight may be especially difficult to achieve in those with severe obesity. In some cases, medications, devices and surgical management might be considered.  What can you do?  If you are overweight or obese and are interested in methods for weight loss, you should discuss this with your provider.     Consider reducing  daily calorie intake by 500 calories.     Keep a food journal.     Avoiding skipping meals, consider cutting portions instead.    Diet combined with exercise helps maintain muscle while optimizing fat loss. Strength training is particularly important for building and maintaining muscle mass. Exercise helps reduce stress, increase energy, and improves fitness. Increasing exercise without diet control, however, may not burn enough calories to loose weight.       Start walking three days a week 10-20 minutes at a time    Work towards walking thirty minutes five days a week     Eventually, increase the speed of your walking for 1-2 minutes at time    In addition, we recommend that you review healthy lifestyles and methods for weight loss available through the National Institutes of Health patient information sites:  http://win.niddk.nih.gov/publications/index.htm    And look into health and wellness programs that may be available through your health insurance provider, employer, local community center, or estephanie club.    Weight management plan: Patient was referred to their PCP to discuss a diet and exercise plan.          Your Body mass index is 37.31 kg/(m^2).  Weight management is a personal decision.  If you are interested in exploring weight loss strategies, the following discussion covers the approaches that may be successful. Body mass index (BMI) is one way to tell whether you are at a healthy weight, overweight, or obese. It measures your weight in relation to your height.  A BMI of 18.5 to 24.9 is in the healthy range. A person with a BMI of 25 to 29.9 is considered overweight, and someone with a BMI of 30 or greater is considered obese. More than two-thirds of American adults are considered overweight or obese.  Being overweight or obese increases the risk for further weight gain. Excess weight may lead to heart disease and diabetes.  Creating and following plans for healthy eating and physical activity may  help you improve your health.  Weight control is part of healthy lifestyle and includes exercise, emotional health, and healthy eating habits. Careful eating habits lifelong are the mainstay of weight control. Though there are significant health benefits from weight loss, long-term weight loss with diet alone may be very difficult to achieve- studies show long-term success with dietary management in less than 10% of people. Attaining a healthy weight may be especially difficult to achieve in those with severe obesity. In some cases, medications, devices and surgical management might be considered.  What can you do?  If you are overweight or obese and are interested in methods for weight loss, you should discuss this with your provider.     Consider reducing daily calorie intake by 500 calories.     Keep a food journal.     Avoiding skipping meals, consider cutting portions instead.    Diet combined with exercise helps maintain muscle while optimizing fat loss. Strength training is particularly important for building and maintaining muscle mass. Exercise helps reduce stress, increase energy, and improves fitness. Increasing exercise without diet control, however, may not burn enough calories to loose weight.       Start walking three days a week 10-20 minutes at a time    Work towards walking thirty minutes five days a week     Eventually, increase the speed of your walking for 1-2 minutes at time    In addition, we recommend that you review healthy lifestyles and methods for weight loss available through the National Institutes of Health patient information sites:  http://win.niddk.nih.gov/publications/index.htm    And look into health and wellness programs that may be available through your health insurance provider, employer, local community center, or estephanie club.    Weight management plan: Patient was referred to their PCP to discuss a diet and exercise plan.          Follow-ups after your visit        Who to  "contact     If you have questions or need follow up information about today's clinic visit or your schedule please contact [facility] directly at [phone].  Normal or non-critical lab and imaging results will be communicated to you by MyChart, letter or phone within 4 business days after the clinic has received the results. If you do not hear from us within 7 days, please contact the clinic through [employer] or phone. If you have a critical or abnormal lab result, we will notify you by phone as soon as possible.  Submit refill requests through [employer] or call your pharmacy and they will forward the refill request to us. Please allow 3 business days for your refill to be completed.          Additional Information About Your Visit        [employer] Information     [employer] gives you secure access to your electronic health record. If you see a primary care provider, you can also send messages to your care team and make appointments. If you have questions, please call your primary care clinic.  If you do not have a primary care provider, please call [phone] and they will assist you.        Care EveryWhere ID     This is your Care EveryWhere ID. This could be used by other organizations to access your [facility] medical records  [id number]        Your Vitals Were     Pulse Respirations Height Pulse Oximetry BMI (Body Mass Index)       103 16 1.638 m (5' 4.5\") 95% 37.31 kg/m2        Blood Pressure from Last 3 Encounters:   07/06/18 131/77   06/01/18 134/87   05/11/18 (!) 135/97    Weight from Last 3 Encounters:   07/06/18 100.2 kg (220 lb 12.8 oz)   06/01/18 99.3 kg (219 lb)   05/11/18 100.2 kg (221 lb)              Today, you had the following     No orders found for display       Primary Care Provider Office Phone # Fax #    Erica Olvera [phone] [address] [facility] [address]  [facility] [address]        Equal Access to Services     SOCORRO ROGERS AH: Hadii benny Ying, " waaxda luqadaha, qaybta kaallillie reyes, andre cardona ah. So [facility] [phone].    ATENCIÓN: Si velia fall, tiene a barahona disposición servicios gratuitos de asistencia lingüística. Zeinab al [phone].    We comply with applicable federal civil rights laws and [place] laws. We do not discriminate on the basis of race, color, national origin, age, disability, sex, sexual orientation, or gender identity.            Thank you!     Thank you for choosing [facility]  for your care. Our goal is always to provide you with excellent care. Hearing back from our patients is one way we can continue to improve our services. Please take a few minutes to complete the written survey that you may receive in the mail after your visit with us. Thank you!             Your Updated Medication List - Protect others around you: Learn how to safely use, store and throw away your medicines at www.disposemymeds.org.          This list is accurate as of 7/6/18 11:31 AM.  Always use your most recent med list.                   Brand Name Dispense Instructions for use Diagnosis    albuterol 108 (90 Base) MCG/ACT Inhaler    VENTOLIN HFA    3 Inhaler    Inhale 2 puffs into the lungs every 6 hours as needed for shortness of breath / dyspnea or wheezing    Mild intermittent asthma without complication       allopurinol 100 MG tablet    ZYLOPRIM    90 tablet    Take 1 tablet (100 mg) by mouth daily    Gout of ankle, unspecified cause, unspecified chronicity, unspecified laterality       ascorbic acid 500 MG tablet    VITAMIN C     1 TABLET TWICE DAILY        aspirin 81 MG tablet     0    ONE DAILY or as needed    Essential hypertension, benign       CALTRATE 600 + D 600-200 MG-IU Tabs     0    1 TABLET DAILY    Routine general medical examination at a health care facility       fluticasone 110 MCG/ACT Inhaler    FLOVENT HFA    3 Inhaler    Inhale 1 puff into the lungs 2 times daily    Mild  intermittent asthma without complication       losartan 25 MG tablet    COZAAR    90 tablet    Take 1 tablet (25 mg) by mouth daily    Benign hypertension with CKD (chronic kidney disease) stage III       mometasone 50 MCG/ACT spray    NASONEX     Spray 2 sprays into both nostrils daily        Multi-vitamin Tabs tablet   Generic drug:  multivitamin, therapeutic with minerals      QD        NEXIUM PO      Take 20 mg by mouth every morning (before breakfast)        order for DME      Equipment being ordered: CPAP        ranitidine 150 MG capsule    ZANTAC     Take 1 capsule by mouth 2 times daily.    Esophageal reflux       simvastatin 40 MG tablet    ZOCOR    90 tablet    Take 1 tablet (40 mg) by mouth At Bedtime    Hyperlipidemia LDL goal <100       ZYRTEC 10 MG tablet   Generic drug:  cetirizine     0    ONE TABLET DAILY    Allergic rhinitis, cause unspecified

## 2018-07-06 NOTE — PATIENT INSTRUCTIONS

## 2018-11-14 ENCOUNTER — DOCUMENTATION ONLY (OUTPATIENT)
Dept: SLEEP MEDICINE | Facility: CLINIC | Age: 73
End: 2018-11-14

## 2018-11-14 DIAGNOSIS — G47.33 OSA (OBSTRUCTIVE SLEEP APNEA): ICD-10-CM

## 2018-11-14 NOTE — PROGRESS NOTES
6 Month [facility] visit    Diagnostic AHI: 34.8  PSG    Data only recheck     Assessment: Pt meeting objective benchmarks.     Action plan: pt to follow up per provider request    Device type: Auto-CPAP  PAP settings: CPAP min 5 cm  H20     CPAP max 15 cm  H20     90th % [id number] cm  H20    Objective measures: 14 day rolling measures         Compliance  82 %     % of night spent in large leak  0 % last  upload      AHI 4.01   last  upload      Average number of minutes 219           Objective measure goal  Compliance   Goal >70%  Leak   Goal < 10%  AHI  Goal < 5  Usage  Goal >240

## 2018-12-18 ENCOUNTER — HOSPITAL ENCOUNTER (OUTPATIENT)
Dept: MAMMOGRAPHY | Facility: CLINIC | Age: 73
Discharge: HOME | End: 2018-12-18
Attending: INTERNAL MEDICINE | Admitting: INTERNAL MEDICINE
Payer: MEDICARE

## 2018-12-18 DIAGNOSIS — R92.8 BI-RADS CATEGORY 3 MAMMOGRAM RESULT: ICD-10-CM

## 2018-12-18 PROCEDURE — 76642 ULTRASOUND BREAST LIMITED: CPT | Mod: RT

## 2019-04-04 ENCOUNTER — OFFICE VISIT (OUTPATIENT)
Dept: SLEEP MEDICINE | Facility: CLINIC | Age: 74
End: 2019-04-04
Payer: MEDICARE

## 2019-04-04 VITALS
OXYGEN SATURATION: 95 % | RESPIRATION RATE: 16 BRPM | SYSTOLIC BLOOD PRESSURE: 129 MMHG | DIASTOLIC BLOOD PRESSURE: 80 MMHG | HEIGHT: 64 IN | WEIGHT: 217 LBS | BODY MASS INDEX: 37.05 KG/M2 | HEART RATE: 79 BPM

## 2019-04-04 DIAGNOSIS — G47.33 OSA (OBSTRUCTIVE SLEEP APNEA): Primary | ICD-10-CM

## 2019-04-04 PROCEDURE — 99213 OFFICE O/P EST LOW 20 MIN: CPT | Performed by: INTERNAL MEDICINE

## 2019-04-04 ASSESSMENT — MIFFLIN-ST. JEOR: SCORE: 1477.06

## 2019-04-04 NOTE — PROGRESS NOTES
Obstructive Sleep Apnea - PAP Follow-Up Visit:    Chief Complaint   Patient presents with     CPAP Follow Up     Follow up navi       Oksana Jerez comes in today for follow-up of their severe sleep apnea, managed with CPAP.     PSG on 4/4/2018 showed an AHI of 34.8 per hour.     Overall, she rates the experience with PAP as 8 (0 poor, 10 great). The mask is comfortable. The mask is not leaking.  She is not snoring with the mask on. She is not having gasp arousals.  She is not having significant oral/nasal dryness. The pressure settings are comfortable.     She uses full-face mask.     Bedtime is typically 10:30 pm. Usually it takes about 20 minutes to fall asleep with the mask on. Wake time is typically 7:30 am.  Patient is using PAP therapy 7 hours per night. The patient is usually getting 7-8 hours of sleep per night.    She does feel rested in the morning.    Total score - [place]: 2 (4/4/2019  1:37 PM)    Respironics    Auto-PAP 5-15 cmH2O download:  30/30 total days of use. 0 nonuse days.  Average use 7 hours 12 minutes per day.  100% days with >4 hours use.  Large leak 3 mins per day average. CPAP 90% pressure 12.9cm. AHI 3.3        Reviewed by team: Tobacco  Allergies  Meds       Reviewed by provider:        Problem List:  Patient Active Problem List    Diagnosis Date Noted     Obesity; BMI 35-40 (H) 06/01/2018     Priority: Medium     NAVI (obstructive sleep apnea) 04/08/2018     Priority: Medium     Urinary incontinence, unspecified type 05/29/2017     Priority: Medium     Witnessed apneic spells 06/21/2016     Priority: Medium     Gout 04/15/2015     Priority: Medium     Advanced directives, counseling/discussion 10/22/2014     Priority: Medium     Advance Care Planning:   ACP Review and Resources Provided:  Reviewed chart for advance care plan.  Oksana Jerez has no plan or code status on file. Letter sent..   Added by Shanda Vines on 10/22/2014             Mild intermittent asthma  "05/14/2014     Priority: Medium     Benign hypertension with CKD (chronic kidney disease) stage III (H) 11/19/2012     Priority: Medium     CKD (chronic kidney disease) stage 3, GFR 30-59 ml/min (H) 11/15/2010     Priority: Medium     Hyperlipidemia 10/31/2010     Priority: Medium     Esophageal reflux 07/25/2006     Priority: Medium     BENIGN HYPERTENSION; goal <140/90 02/11/2003     Priority: Medium     Allergic rhinitis 02/11/2003     Priority: Medium     Problem list name updated by automated process. Provider to review            /80   Pulse 79   Resp 16   Ht 1.638 m (5' 4.49\")   Wt 98.4 kg (217 lb)   SpO2 95%   BMI 36.69 kg/m      Impression/Plan:     Severe sleep apnea.     -  Tolerating PAP well. Daytime symptoms are improved. Regular compliance and normal AHI is demonstrated on download.     Plan:     1. Continue auto PAP therapy     Oksana ERWIN Jerez will follow up in about 1 year(s).     Fifteen minutes spent with patient, all of which were spent face-to-face counseling, consulting, coordinating plan of care.      Ian Shepard MD, MD    CC:  Erica Olvera,   "

## 2019-04-04 NOTE — PATIENT INSTRUCTIONS
Your Body mass index is 36.69 kg/m .  Weight management is a personal decision.  If you are interested in exploring weight loss strategies, the following discussion covers the approaches that may be successful. Body mass index (BMI) is one way to tell whether you are at a healthy weight, overweight, or obese. It measures your weight in relation to your height.  A BMI of 18.5 to 24.9 is in the healthy range. A person with a BMI of 25 to 29.9 is considered overweight, and someone with a BMI of 30 or greater is considered obese. More than two-thirds of American adults are considered overweight or obese.  Being overweight or obese increases the risk for further weight gain. Excess weight may lead to heart disease and diabetes.  Creating and following plans for healthy eating and physical activity may help you improve your health.  Weight control is part of healthy lifestyle and includes exercise, emotional health, and healthy eating habits. Careful eating habits lifelong are the mainstay of weight control. Though there are significant health benefits from weight loss, long-term weight loss with diet alone may be very difficult to achieve- studies show long-term success with dietary management in less than 10% of people. Attaining a healthy weight may be especially difficult to achieve in those with severe obesity. In some cases, medications, devices and surgical management might be considered.  What can you do?  If you are overweight or obese and are interested in methods for weight loss, you should discuss this with your provider.     Consider reducing daily calorie intake by 500 calories.     Keep a food journal.     Avoiding skipping meals, consider cutting portions instead.    Diet combined with exercise helps maintain muscle while optimizing fat loss. Strength training is particularly important for building and maintaining muscle mass. Exercise helps reduce stress, increase energy, and improves fitness.  Increasing exercise without diet control, however, may not burn enough calories to loose weight.       Start walking three days a week 10-20 minutes at a time    Work towards walking thirty minutes five days a week     Eventually, increase the speed of your walking for 1-2 minutes at time    In addition, we recommend that you review healthy lifestyles and methods for weight loss available through the National Institutes of Health patient information sites:  http://win.niddk.nih.gov/publications/index.htm    And look into health and wellness programs that may be available through your health insurance provider, employer, local community center, or estephanie club.    Weight management plan: Patient was referred to their PCP to discuss a diet and exercise plan.

## 2019-06-18 ENCOUNTER — OFFICE VISIT (OUTPATIENT)
Dept: FAMILY MEDICINE | Facility: CLINIC | Age: 74
End: 2019-06-18
Payer: MEDICARE

## 2019-06-18 VITALS
HEART RATE: 72 BPM | TEMPERATURE: 97.7 F | OXYGEN SATURATION: 96 % | DIASTOLIC BLOOD PRESSURE: 82 MMHG | SYSTOLIC BLOOD PRESSURE: 132 MMHG | BODY MASS INDEX: 35.82 KG/M2 | WEIGHT: 215 LBS | HEIGHT: 65 IN

## 2019-06-18 DIAGNOSIS — E55.9 VITAMIN D DEFICIENCY: ICD-10-CM

## 2019-06-18 DIAGNOSIS — E78.5 HYPERLIPIDEMIA LDL GOAL <100: ICD-10-CM

## 2019-06-18 DIAGNOSIS — E66.01 MORBID OBESITY (H): ICD-10-CM

## 2019-06-18 DIAGNOSIS — Z13.29 SCREENING FOR HYPOTHYROIDISM: ICD-10-CM

## 2019-06-18 DIAGNOSIS — M10.9 GOUT OF ANKLE, UNSPECIFIED CAUSE, UNSPECIFIED CHRONICITY, UNSPECIFIED LATERALITY: ICD-10-CM

## 2019-06-18 DIAGNOSIS — G47.33 OSA (OBSTRUCTIVE SLEEP APNEA): ICD-10-CM

## 2019-06-18 DIAGNOSIS — K21.9 GASTROESOPHAGEAL REFLUX DISEASE, ESOPHAGITIS PRESENCE NOT SPECIFIED: ICD-10-CM

## 2019-06-18 DIAGNOSIS — R32 URINARY INCONTINENCE, UNSPECIFIED TYPE: ICD-10-CM

## 2019-06-18 DIAGNOSIS — Z00.00 MEDICARE ANNUAL WELLNESS VISIT, SUBSEQUENT: Primary | ICD-10-CM

## 2019-06-18 DIAGNOSIS — N18.30 BENIGN HYPERTENSION WITH CKD (CHRONIC KIDNEY DISEASE) STAGE III (H): ICD-10-CM

## 2019-06-18 DIAGNOSIS — I12.9 BENIGN HYPERTENSION WITH CKD (CHRONIC KIDNEY DISEASE) STAGE III (H): ICD-10-CM

## 2019-06-18 DIAGNOSIS — J45.20 MILD INTERMITTENT ASTHMA WITHOUT COMPLICATION: ICD-10-CM

## 2019-06-18 LAB
ALBUMIN UR-MCNC: ABNORMAL MG/DL
APPEARANCE UR: CLEAR
BACTERIA #/AREA URNS HPF: ABNORMAL /HPF
BILIRUB UR QL STRIP: NEGATIVE
COLOR UR AUTO: YELLOW
ERYTHROCYTE [DISTWIDTH] IN BLOOD BY AUTOMATED COUNT: 12.7 % (ref 10–15)
GLUCOSE UR STRIP-MCNC: NEGATIVE MG/DL
HCT VFR BLD AUTO: 38.5 % (ref 35–47)
HGB BLD-MCNC: 12.6 G/DL (ref 11.7–15.7)
HGB UR QL STRIP: NEGATIVE
KETONES UR STRIP-MCNC: NEGATIVE MG/DL
LEUKOCYTE ESTERASE UR QL STRIP: ABNORMAL
MCH RBC QN AUTO: 30.7 PG (ref 26.5–33)
MCHC RBC AUTO-ENTMCNC: 32.7 G/DL (ref 31.5–36.5)
MCV RBC AUTO: 94 FL (ref 78–100)
NITRATE UR QL: NEGATIVE
NON-SQ EPI CELLS #/AREA URNS LPF: ABNORMAL /LPF
PH UR STRIP: 5.5 PH (ref 5–7)
PLATELET # BLD AUTO: 367 10E9/L (ref 150–450)
RBC # BLD AUTO: 4.11 10E12/L (ref 3.8–5.2)
RBC #/AREA URNS AUTO: ABNORMAL /HPF
SOURCE: ABNORMAL
SP GR UR STRIP: 1.02 (ref 1–1.03)
UROBILINOGEN UR STRIP-ACNC: 0.2 EU/DL (ref 0.2–1)
WBC # BLD AUTO: 10.9 10E9/L (ref 4–11)
WBC #/AREA URNS AUTO: ABNORMAL /HPF

## 2019-06-18 PROCEDURE — 80061 LIPID PANEL: CPT | Performed by: INTERNAL MEDICINE

## 2019-06-18 PROCEDURE — 82306 VITAMIN D 25 HYDROXY: CPT | Performed by: INTERNAL MEDICINE

## 2019-06-18 PROCEDURE — 84443 ASSAY THYROID STIM HORMONE: CPT | Performed by: INTERNAL MEDICINE

## 2019-06-18 PROCEDURE — 81001 URINALYSIS AUTO W/SCOPE: CPT | Performed by: INTERNAL MEDICINE

## 2019-06-18 PROCEDURE — 85027 COMPLETE CBC AUTOMATED: CPT | Performed by: INTERNAL MEDICINE

## 2019-06-18 PROCEDURE — 84550 ASSAY OF BLOOD/URIC ACID: CPT | Performed by: INTERNAL MEDICINE

## 2019-06-18 PROCEDURE — 99397 PER PM REEVAL EST PAT 65+ YR: CPT | Performed by: INTERNAL MEDICINE

## 2019-06-18 PROCEDURE — 80053 COMPREHEN METABOLIC PANEL: CPT | Performed by: INTERNAL MEDICINE

## 2019-06-18 PROCEDURE — 36415 COLL VENOUS BLD VENIPUNCTURE: CPT | Performed by: INTERNAL MEDICINE

## 2019-06-18 RX ORDER — LOSARTAN POTASSIUM 25 MG/1
25 TABLET ORAL DAILY
Qty: 90 TABLET | Refills: 3 | Status: SHIPPED | OUTPATIENT
Start: 2019-06-18 | End: 2020-07-07

## 2019-06-18 RX ORDER — FLUTICASONE PROPIONATE 110 UG/1
1 AEROSOL, METERED RESPIRATORY (INHALATION) 2 TIMES DAILY
Qty: 3 INHALER | Refills: 3 | Status: SHIPPED | OUTPATIENT
Start: 2019-06-18 | End: 2021-06-15

## 2019-06-18 RX ORDER — ALBUTEROL SULFATE 90 UG/1
2 AEROSOL, METERED RESPIRATORY (INHALATION) EVERY 6 HOURS PRN
Qty: 3 INHALER | Refills: 3 | Status: SHIPPED | OUTPATIENT
Start: 2019-06-18 | End: 2020-10-26

## 2019-06-18 RX ORDER — SIMVASTATIN 40 MG
40 TABLET ORAL AT BEDTIME
Qty: 90 TABLET | Refills: 3 | Status: SHIPPED | OUTPATIENT
Start: 2019-06-18 | End: 2020-07-07

## 2019-06-18 RX ORDER — ALLOPURINOL 100 MG/1
100 TABLET ORAL DAILY
Qty: 90 TABLET | Refills: 3 | Status: SHIPPED | OUTPATIENT
Start: 2019-06-18 | End: 2020-07-07

## 2019-06-18 SDOH — HEALTH STABILITY: MENTAL HEALTH: HOW MANY DRINKS CONTAINING ALCOHOL DO YOU HAVE ON A TYPICAL DAY WHEN YOU ARE DRINKING?: 1 OR 2

## 2019-06-18 SDOH — HEALTH STABILITY: MENTAL HEALTH: HOW OFTEN DO YOU HAVE SIX OR MORE DRINKS ON ONE OCCASION?: NEVER

## 2019-06-18 SDOH — HEALTH STABILITY: MENTAL HEALTH: HOW OFTEN DO YOU HAVE A DRINK CONTAINING ALCOHOL?: 2-4 TIMES A MONTH

## 2019-06-18 ASSESSMENT — ACTIVITIES OF DAILY LIVING (ADL): CURRENT_FUNCTION: NO ASSISTANCE NEEDED

## 2019-06-18 ASSESSMENT — MIFFLIN-ST. JEOR: SCORE: 1468.17

## 2019-06-18 NOTE — LETTER
[address]  [address]  [place]  [address]  Tel: [phone]    July 9, 2019    Oksana HOOD Meri  [facility] [facility] [address]        Dear MsDebby Meri,    Enclosed is a copy of your lab reports from your recent wellness exam with associated comments.    Vitamin D is important for bone health and your vitamin D is normal at 32, anything greater than 30 is normal.    The TSH is a sensitive indicator of thyroid function and your TSH is within normal range and essentially unchanged from 2 years ago.    Your uric acid is normal.  The comprehensive metabolic panel  Shows that your minerals and electrolytes were all normal, your kidney function tests were essentially normal as were all your liver function tests.  Your fasting blood sugar was slightly higher than normal at 104.    Your lipid panel was in a very good range.  The total cholesterol was 132, anything less than 200 is normal.  The triglycerides were 123, better than 166 a year ago and anything less than 150 is normal.the HDL or good cholesterol was 53, better than 46-year ago and anything higher than 50 is normal.  The HDL is intimately related to regular aerobic activities.  The most important factor is the LDL or bad cholesterol which was 54, anything less than 100 is good and anything less than 70 is better.    The CBC showed that your blood cell counts were all normal, no sign of low blood or anemia.  Your urinalysis was essentially normal with a few bacteria in your urine which are probably contaminants.    In general things are looking good and if you have any questions regarding these reports please let me know.      Sincerely,    Shaun Ewing MD/LAWSON          Enclosure: Lab Results  Results for orders placed or performed in visit on 06/18/19   UA reflex to Microscopic and Culture   Result Value Ref Range    Color Urine Yellow     Appearance Urine Clear     Glucose Urine Negative NEG^Negative mg/dL     Bilirubin Urine Negative NEG^Negative    Ketones Urine Negative NEG^Negative mg/dL    Specific Gravity Urine 1.025 1.003 - 1.035    Blood Urine Negative NEG^Negative    pH Urine 5.5 5.0 - 7.0 pH    Protein Albumin Urine Trace (A) NEG^Negative mg/dL    Urobilinogen Urine 0.2 0.2 - 1.0 EU/dL    Nitrite Urine Negative NEG^Negative    Leukocyte Esterase Urine Trace (A) NEG^Negative    Source Midstream Urine    CBC with platelets   Result Value Ref Range    WBC 10.9 4.0 - 11.0 10e9/L    RBC Count 4.11 3.8 - 5.2 10e12/L    Hemoglobin 12.6 11.7 - 15.7 g/dL    Hematocrit 38.5 35.0 - 47.0 %    MCV 94 78 - 100 fl    MCH 30.7 26.5 - 33.0 pg    MCHC 32.7 31.5 - 36.5 g/dL    RDW 12.7 10.0 - 15.0 %    Platelet Count 367 150 - 450 10e9/L   Comprehensive metabolic panel   Result Value Ref Range    Sodium 136 133 - 144 mmol/L    Potassium 4.7 3.4 - 5.3 mmol/L    Chloride 104 94 - 109 mmol/L    Carbon Dioxide 22 20 - 32 mmol/L    Anion Gap 10 3 - 14 mmol/L    Glucose 104 (H) 70 - 99 mg/dL    Urea Nitrogen 32 (H) 7 - 30 mg/dL    Creatinine 1.05 (H) 0.52 - 1.04 mg/dL    GFR Estimate 52 (L) >60 mL/min/[1.73_m2]    GFR Estimate If Black 60 (L) >60 mL/min/[1.73_m2]    Calcium 9.2 8.5 - 10.1 mg/dL    Bilirubin Total 0.3 0.2 - 1.3 mg/dL    Albumin 3.6 3.4 - 5.0 g/dL    Protein Total 8.1 6.8 - 8.8 g/dL    Alkaline Phosphatase 130 40 - 150 U/L    ALT 30 0 - 50 U/L    AST 23 0 - 45 U/L   Lipid panel reflex to direct LDL Fasting   Result Value Ref Range    Cholesterol 132 <200 mg/dL    Triglycerides 123 <150 mg/dL    HDL Cholesterol 53 >49 mg/dL    LDL Cholesterol Calculated 54 <100 mg/dL    Non HDL Cholesterol 79 <130 mg/dL   Vitamin D Deficiency   Result Value Ref Range    Vitamin D Deficiency screening 32 20 - 75 ug/L   TSH with free T4 reflex   Result Value Ref Range    TSH 3.34 0.40 - 4.00 mU/L   Uric acid   Result Value Ref Range    Uric Acid 5.9 2.6 - 6.0 mg/dL   Urine Microscopic   Result Value Ref Range    WBC Urine 0 - 5 OTO5^0 - 5  /HPF    RBC Urine O - 2 OTO2^O - 2 /HPF    Squamous Epithelial /LPF Urine Few FEW^Few /LPF    Bacteria Urine Few (A) NEG^Negative /HPF

## 2019-06-18 NOTE — PROGRESS NOTES
"SUBJECTIVE:   Oksana Jerez is a 73 year old female who presents for Preventive Visit.    Are you in the first 12 months of your Medicare coverage?  No    Healthy Habits:     In general, how would you rate your overall health?  Very good    Frequency of exercise:  None    Do you usually eat at least 4 servings of fruit and vegetables a day, include whole grains    & fiber and avoid regularly eating high fat or \"junk\" foods?  No (2- 3 servings)    Taking medications regularly:  Yes    Barriers to taking medications:  None    Medication side effects:  None    Ability to successfully perform activities of daily living:  No assistance needed    Home Safety:  No safety concerns identified    Hearing Impairment:  No hearing concerns    In the past 6 months, have you been bothered by leaking of urine? Yes    In general, how would you rate your overall mental or emotional health?  Very good      PHQ-2 Total Score: 0    Additional concerns today:  No    Do you feel safe in your environment? YES    Do you have a Health Care Directive? Yes: Patient states has Advance Directive and will bring in a copy to clinic.      Fall risk  Fallen 2 or more times in the past year?: No  Any fall with injury in the past year?: No    Cognitive Screening   1) Repeat 3 items (Leader, Season, Table)    2) Clock draw: NORMAL  3) 3 item recall: Recalls 3 objects  Results: 3 items recalled: COGNITIVE IMPAIRMENT LESS LIKELY    Mini-CogTM Copyright NESTOR Reddy. Licensed by the author for use in [facility]; reprinted with permission (harman@.[facility]). All rights reserved.      Do you have sleep apnea, excessive snoring or daytime drowsiness?: yes Sleep apnea DX; patient uses CPAP    Reviewed and updated as needed this visit by clinical staff  Tobacco  Allergies  Meds  Soc Hx        Reviewed and updated as needed this visit by Provider        Social History     Tobacco Use     Smoking status: Former Smoker     Last attempt to quit: " 1974     Years since quittin.4     Smokeless tobacco: Never Used   Substance Use Topics     Alcohol use: Yes     Alcohol/week: 0.0 oz     Frequency: 2-4 times a month     Drinks per session: 1 or 2     Binge frequency: Never     Comment: 1-2 drinks weekly     If you drink alcohol do you typically have >3 drinks per day or >7 drinks per week? No    Alcohol Use 5/10/2017   Prescreen: >3 drinks/day or >7 drinks/week? The patient does not drink >3 drinks per day nor >7 drinks per week.           Current providers sharing in care for this patient include:   Patient Care Team:  Shaun Ewing MD as PCP - General (Internal Medicine)  Erica Olvera DO as Assigned PCP    The following health maintenance items are reviewed in Epic and correct as of today:  Health Maintenance   Topic Date Due     ZOSTER IMMUNIZATION (2 of 3) 10/27/2010     ASTHMA CONTROL TEST  2018     MEDICARE ANNUAL WELLNESS VISIT  2019     ASTHMA ACTION PLAN  2019     ADVANCE CARE PLANNING  10/22/2019     MAMMO SCREENING  2020     LIPID  2023     COLONOSCOPY  2024     DTAP/TDAP/TD IMMUNIZATION (3 - Td) 2025     DEXA  Completed     PHQ-2  Completed     INFLUENZA VACCINE  Completed     PNEUMOCOCCAL IMMUNIZATION 65+ LOW/MEDIUM RISK  Completed     IPV IMMUNIZATION  Aged Out     MENINGITIS IMMUNIZATION  Aged Out     Current Outpatient Medications   Medication Sig Dispense Refill     albuterol (VENTOLIN HFA) 108 (90 Base) MCG/ACT inhaler Inhale 2 puffs into the lungs every 6 hours as needed for shortness of breath / dyspnea or wheezing 3 Inhaler 3     allopurinol (ZYLOPRIM) 100 MG tablet Take 1 tablet (100 mg) by mouth daily 90 tablet 3     ASPIRIN 81 MG OR TABS ONE DAILY or as needed  0 0     CALTRATE 600 + D 600-200 MG-IU OR TABS 1 TABLET DAILY 0 0     Esomeprazole Magnesium (NEXIUM PO) Take 20 mg by mouth every morning (before breakfast)       fluticasone (FLOVENT HFA) 110 MCG/ACT inhaler Inhale 1 puff  "into the lungs 2 times daily 3 Inhaler 3     losartan (COZAAR) 25 MG tablet Take 1 tablet (25 mg) by mouth daily 90 tablet 3     mometasone (NASONEX) 50 MCG/ACT nasal spray Spray 2 sprays into both nostrils daily       MULTI-VITAMIN OR TABS QD       order for DME Equipment being ordered: CPAP       ranitidine (ZANTAC) 150 MG capsule Take 1 capsule by mouth 2 times daily.  0     simvastatin (ZOCOR) 40 MG tablet Take 1 tablet (40 mg) by mouth At Bedtime 90 tablet 3     VITAMIN C 500 MG OR TABS 1 TABLET TWICE DAILY       ZYRTEC 10 MG OR TABS ONE TABLET DAILY 0 0     Allergies   Allergen Reactions     Flonase [Fluticasone] Other (See Comments)     Bloody nose, needed cauterizing     Lisinopril Other (See Comments)     Cough       Trees          Review of Systems  CONSTITUTIONAL: NEGATIVE for fever, chills, change in weight  INTEGUMENTARY/SKIN: NEGATIVE for worrisome rashes, moles or lesions  EYES: NEGATIVE for vision changes or irritation  ENT/MOUTH: NEGATIVE for ear, mouth and throat problems  RESP: NEGATIVE for significant cough or SOB  BREAST: NEGATIVE for masses, tenderness or discharge  CV: NEGATIVE for chest pain, palpitations or peripheral edema  GI: NEGATIVE for nausea, abdominal pain, heartburn, or change in bowel habits  : NEGATIVE for frequency, dysuria, or hematuria  MUSCULOSKELETAL: NEGATIVE for significant arthralgias or myalgia  NEURO: NEGATIVE for weakness, dizziness or paresthesias  ENDOCRINE: NEGATIVE for temperature intolerance, skin/hair changes  HEME: NEGATIVE for bleeding problems  PSYCHIATRIC: NEGATIVE for changes in mood or affect    OBJECTIVE:   /82 (BP Location: Left arm, Patient Position: Sitting, Cuff Size: Adult Large)   Pulse 72   Temp 97.7  F (36.5  C) (Oral)   Ht 1.638 m (5' 4.5\")   Wt 97.5 kg (215 lb)   SpO2 96%   Breastfeeding? No   BMI 36.33 kg/m   Estimated body mass index is 36.33 kg/m  as calculated from the following:    Height as of this encounter: 1.638 m (5' " "4.5\").    Weight as of this encounter: 97.5 kg (215 lb).  Physical Exam  GENERAL APPEARANCE: healthy, alert and no distress  EYES: Eyes grossly normal to inspection, PERRL and conjunctivae and sclerae normal  HENT: ear canals and TM's normal, nose and mouth without ulcers or lesions, oropharynx clear and oral mucous membranes moist  NECK: no adenopathy, no asymmetry, masses, or scars and thyroid normal to palpation  RESP: lungs clear to auscultation - no rales, rhonchi or wheezes  BREAST: normal without masses, tenderness or nipple discharge and no palpable axillary masses or adenopathy  CV: regular rate and rhythm, normal S1 S2, no S3 or S4, no murmur, click or rub, no peripheral edema and peripheral pulses strong  ABDOMEN: soft, nontender, no hepatosplenomegaly, no masses and bowel sounds normal  MS: no musculoskeletal defects are noted and gait is age appropriate without ataxia  SKIN: no suspicious lesions or rashes  NEURO: Normal strength and tone, sensory exam grossly normal, mentation intact and speech normal  PSYCH: mentation appears normal and affect normal/bright        ASSESSMENT / PLAN:   1. Medicare annual wellness visit, subsequent    - UA reflex to Microscopic and Culture  - CBC with platelets  - Comprehensive metabolic panel  - Lipid panel reflex to direct LDL Fasting  - Vitamin D Deficiency  - TSH with free T4 reflex  - Uric acid  - Urine Microscopic    2. Benign hypertension with CKD (chronic kidney disease) stage III (H)    - UA reflex to Microscopic and Culture  - CBC with platelets  - Comprehensive metabolic panel  - losartan (COZAAR) 25 MG tablet; Take 1 tablet (25 mg) by mouth daily  Dispense: 90 tablet; Refill: 3    3. Morbid obesity (H)      4. Mild intermittent asthma without complication    - albuterol (VENTOLIN HFA) 108 (90 Base) MCG/ACT inhaler; Inhale 2 puffs into the lungs every 6 hours as needed for shortness of breath / dyspnea or wheezing  Dispense: 3 Inhaler; Refill: 3  - " "fluticasone (FLOVENT HFA) 110 MCG/ACT inhaler; Inhale 1 puff into the lungs 2 times daily  Dispense: 3 Inhaler; Refill: 3    5. Hyperlipidemia    - Lipid panel reflex to direct LDL Fasting  - simvastatin (ZOCOR) 40 MG tablet; Take 1 tablet (40 mg) by mouth At Bedtime  Dispense: 90 tablet; Refill: 3    6. TENA (obstructive sleep apnea)      7. Urinary incontinence, unspecified type      8. Gout of ankle, unspecified cause, unspecified chronicity, unspecified laterality    - Uric acid  - allopurinol (ZYLOPRIM) 100 MG tablet; Take 1 tablet (100 mg) by mouth daily  Dispense: 90 tablet; Refill: 3    9. Gastroesophageal reflux disease, esophagitis presence not specified      10. Screening for hypothyroidism    - TSH with free T4 reflex    11. Vitamin D deficiency    - Vitamin D Deficiency    End of Life Planning:  Patient currently has an advanced directive:     COUNSELING:      Estimated body mass index is 36.33 kg/m  as calculated from the following:    Height as of this encounter: 1.638 m (5' 4.5\").    Weight as of this encounter: 97.5 kg (215 lb).         reports that she quit smoking about 45 years ago. She has never used smokeless tobacco.      Appropriate preventive services were discussed with this patient, including applicable screening as appropriate for cardiovascular disease, diabetes, osteopenia/osteoporosis, and glaucoma.  As appropriate for age/gender, discussed screening for colorectal cancer, prostate cancer, breast cancer, and cervical cancer. Checklist reviewing preventive services available has been given to the patient.    Reviewed patients plan of care and provided an AVS. The  for Oksana meets the Care Plan requirement. This Care Plan has been established and reviewed with the .    Counseling Resources:  ATP IV Guidelines  Pooled Cohorts Equation Calculator  Breast Cancer Risk Calculator  FRAX Risk Assessment  ICSI Preventive Guidelines  Dietary Guidelines for Americans, 2010  USDA's MyPlate  ASA " Prophylaxis  Lung CA Screening  RTC 1 yr PE  Shaun Ewing MD  [facility]    Identified Health Risks:

## 2019-06-19 LAB
ALBUMIN SERPL-MCNC: 3.6 G/DL (ref 3.4–5)
ALP SERPL-CCNC: 130 U/L (ref 40–150)
ALT SERPL W P-5'-P-CCNC: 30 U/L (ref 0–50)
ANION GAP SERPL CALCULATED.3IONS-SCNC: 10 MMOL/L (ref 3–14)
AST SERPL W P-5'-P-CCNC: 23 U/L (ref 0–45)
BILIRUB SERPL-MCNC: 0.3 MG/DL (ref 0.2–1.3)
BUN SERPL-MCNC: 32 MG/DL (ref 7–30)
CALCIUM SERPL-MCNC: 9.2 MG/DL (ref 8.5–10.1)
CHLORIDE SERPL-SCNC: 104 MMOL/L (ref 94–109)
CHOLEST SERPL-MCNC: 132 MG/DL
CO2 SERPL-SCNC: 22 MMOL/L (ref 20–32)
CREAT SERPL-MCNC: 1.05 MG/DL (ref 0.52–1.04)
DEPRECATED CALCIDIOL+CALCIFEROL SERPL-MC: 32 UG/L (ref 20–75)
GFR SERPL CREATININE-BSD FRML MDRD: 52 ML/MIN/{1.73_M2}
GLUCOSE SERPL-MCNC: 104 MG/DL (ref 70–99)
HDLC SERPL-MCNC: 53 MG/DL
LDLC SERPL CALC-MCNC: 54 MG/DL
NONHDLC SERPL-MCNC: 79 MG/DL
POTASSIUM SERPL-SCNC: 4.7 MMOL/L (ref 3.4–5.3)
PROT SERPL-MCNC: 8.1 G/DL (ref 6.8–8.8)
SODIUM SERPL-SCNC: 136 MMOL/L (ref 133–144)
TRIGL SERPL-MCNC: 123 MG/DL
TSH SERPL DL<=0.005 MIU/L-ACNC: 3.34 MU/L (ref 0.4–4)
URATE SERPL-MCNC: 5.9 MG/DL (ref 2.6–6)

## 2019-06-19 ASSESSMENT — ASTHMA QUESTIONNAIRES: ACT_TOTALSCORE: 19

## 2019-06-24 ENCOUNTER — OFFICE VISIT (OUTPATIENT)
Dept: SLEEP MEDICINE | Facility: CLINIC | Age: 74
End: 2019-06-24
Payer: MEDICARE

## 2019-06-24 VITALS
DIASTOLIC BLOOD PRESSURE: 80 MMHG | BODY MASS INDEX: 35.49 KG/M2 | RESPIRATION RATE: 16 BRPM | HEIGHT: 65 IN | HEART RATE: 80 BPM | WEIGHT: 213 LBS | SYSTOLIC BLOOD PRESSURE: 126 MMHG | OXYGEN SATURATION: 95 %

## 2019-06-24 DIAGNOSIS — G47.33 OSA (OBSTRUCTIVE SLEEP APNEA): Primary | ICD-10-CM

## 2019-06-24 PROCEDURE — 99213 OFFICE O/P EST LOW 20 MIN: CPT | Performed by: INTERNAL MEDICINE

## 2019-06-24 ASSESSMENT — MIFFLIN-ST. JEOR: SCORE: 1459.1

## 2019-06-24 NOTE — NURSING NOTE
"Chief Complaint   Patient presents with     CPAP Follow Up       Initial /88   Pulse 80   Resp 16   Ht 1.638 m (5' 4.5\")   Wt 96.6 kg (213 lb)   SpO2 95%   BMI 36.00 kg/m   Estimated body mass index is 36 kg/m  as calculated from the following:    Height as of this encounter: 1.638 m (5' 4.5\").    Weight as of this encounter: 96.6 kg (213 lb).    Medication Reconciliation: complete     ESS   Erica Laurent        "

## 2019-06-24 NOTE — PATIENT INSTRUCTIONS
Your BMI is Body mass index is 36 kg/m .  Weight management is a personal decision.  If you are interested in exploring weight loss strategies, the following discussion covers the approaches that may be successful. Body mass index (BMI) is one way to tell whether you are at a healthy weight, overweight, or obese. It measures your weight in relation to your height.  A BMI of 18.5 to 24.9 is in the healthy range. A person with a BMI of 25 to 29.9 is considered overweight, and someone with a BMI of 30 or greater is considered obese. More than two-thirds of American adults are considered overweight or obese.  Being overweight or obese increases the risk for further weight gain. Excess weight may lead to heart disease and diabetes.  Creating and following plans for healthy eating and physical activity may help you improve your health.  Weight control is part of healthy lifestyle and includes exercise, emotional health, and healthy eating habits. Careful eating habits lifelong are the mainstay of weight control. Though there are significant health benefits from weight loss, long-term weight loss with diet alone may be very difficult to achieve- studies show long-term success with dietary management in less than 10% of people. Attaining a healthy weight may be especially difficult to achieve in those with severe obesity. In some cases, medications, devices and surgical management might be considered.  What can you do?  If you are overweight or obese and are interested in methods for weight loss, you should discuss this with your provider.     Consider reducing daily calorie intake by 500 calories.     Keep a food journal.     Avoiding skipping meals, consider cutting portions instead.    Diet combined with exercise helps maintain muscle while optimizing fat loss. Strength training is particularly important for building and maintaining muscle mass. Exercise helps reduce stress, increase energy, and improves fitness.  Increasing exercise without diet control, however, may not burn enough calories to loose weight.       Start walking three days a week 10-20 minutes at a time    Work towards walking thirty minutes five days a week     Eventually, increase the speed of your walking for 1-2 minutes at time    In addition, we recommend that you review healthy lifestyles and methods for weight loss available through the National Institutes of Health patient information sites:  http://win.niddk.nih.gov/publications/index.htm    And look into health and wellness programs that may be available through your health insurance provider, employer, local community center, or estephanie club.    Weight management plan: Patient was referred to their PCP to discuss a diet and exercise plan.        Your Body mass index is 36 kg/m .  Weight management is a personal decision.  If you are interested in exploring weight loss strategies, the following discussion covers the approaches that may be successful. Body mass index (BMI) is one way to tell whether you are at a healthy weight, overweight, or obese. It measures your weight in relation to your height.  A BMI of 18.5 to 24.9 is in the healthy range. A person with a BMI of 25 to 29.9 is considered overweight, and someone with a BMI of 30 or greater is considered obese. More than two-thirds of American adults are considered overweight or obese.  Being overweight or obese increases the risk for further weight gain. Excess weight may lead to heart disease and diabetes.  Creating and following plans for healthy eating and physical activity may help you improve your health.  Weight control is part of healthy lifestyle and includes exercise, emotional health, and healthy eating habits. Careful eating habits lifelong are the mainstay of weight control. Though there are significant health benefits from weight loss, long-term weight loss with diet alone may be very difficult to achieve- studies show long-term  success with dietary management in less than 10% of people. Attaining a healthy weight may be especially difficult to achieve in those with severe obesity. In some cases, medications, devices and surgical management might be considered.  What can you do?  If you are overweight or obese and are interested in methods for weight loss, you should discuss this with your provider.     Consider reducing daily calorie intake by 500 calories.     Keep a food journal.     Avoiding skipping meals, consider cutting portions instead.    Diet combined with exercise helps maintain muscle while optimizing fat loss. Strength training is particularly important for building and maintaining muscle mass. Exercise helps reduce stress, increase energy, and improves fitness. Increasing exercise without diet control, however, may not burn enough calories to loose weight.       Start walking three days a week 10-20 minutes at a time    Work towards walking thirty minutes five days a week     Eventually, increase the speed of your walking for 1-2 minutes at time    In addition, we recommend that you review healthy lifestyles and methods for weight loss available through the National Institutes of Health patient information sites:  http://win.niddk.nih.gov/publications/index.htm    And look into health and wellness programs that may be available through your health insurance provider, employer, local community center, or estephanie club.    Weight management plan: Patient was referred to their PCP to discuss a diet and exercise plan.

## 2019-06-24 NOTE — PROGRESS NOTES
Obstructive Sleep Apnea - PAP Follow-Up Visit:    Chief Complaint   Patient presents with     CPAP Follow Up       Oksana Jerez comes in today for follow-up of their severe sleep apnea, managed with CPAP.     PSG on 4/4/2018 showed an AHI of 34.8 per hour.     Overall, she rates the experience with PAP as 10 (0 poor, 10 great). The mask is comfortable. The mask is not leaking.  She is not snoring with the mask on. She is not having gasp arousals.  She is not having significant oral/nasal dryness. The pressure settings are comfortable.     She uses full-face mask.     Bedtime is typically 10:30 pm. Usually it takes about 20 minutes to fall asleep with the mask on. Wake time is typically 6:30 - 8 am.  Patient is using PAP therapy 7 hours per night. The patient is usually getting 8 hours of sleep per night.    She does feel rested in the morning.    Total score - Bracey: 2 (4/4/2019  1:37 PM)    Respironics    Auto-PAP 5-15 cmH2O download:  30/30 total days of use. 0 nonuse days.  Average use 6 hours 53 minutes per day.  100% days with >4 hours use.  Large leak 2 mins per day average. CPAP 90% pressure 12.7cm. AHI 3.2       Reviewed by team: Tobacco  Allergies  Meds       Reviewed by provider:        Problem List:  Patient Active Problem List    Diagnosis Date Noted     Obesity; BMI 35-40 (H) 06/01/2018     Priority: Medium     TENA (obstructive sleep apnea) 04/08/2018     Priority: Medium     Urinary incontinence, unspecified type 05/29/2017     Priority: Medium     Witnessed apneic spells 06/21/2016     Priority: Medium     Gout 04/15/2015     Priority: Medium     Advanced directives, counseling/discussion 10/22/2014     Priority: Medium     Advance Care Planning:   ACP Review and Resources Provided:  Reviewed chart for advance care plan.  Oksana Jerez has no plan or code status on file. Letter sent..   Added by Shanda Vines on 10/22/2014             Mild intermittent asthma 05/14/2014      "Priority: Medium     Benign hypertension with CKD (chronic kidney disease) stage III (H) 11/19/2012     Priority: Medium     CKD (chronic kidney disease) stage 3, GFR 30-59 ml/min (H) 11/15/2010     Priority: Medium     Hyperlipidemia 10/31/2010     Priority: Medium     Esophageal reflux 07/25/2006     Priority: Medium     BENIGN HYPERTENSION; goal <140/90 02/11/2003     Priority: Medium     Allergic rhinitis 02/11/2003     Priority: Medium     Problem list name updated by automated process. Provider to review            /88   Pulse 80   Resp 16   Ht 1.638 m (5' 4.5\")   Wt 96.6 kg (213 lb)   SpO2 95%   BMI 36.00 kg/m      Impression/Plan:     Severe sleep apnea.     -  Tolerating PAP well and benefits from its use. Daytime symptoms are stable. Regular compliance and normal AHI is demonstrated on download.     Plan:     1. Continue auto PAP therapy     Oksanasumit Hoyoson will follow up in about 1 year(s).     Ten minutes spent with patient, all of which were spent face-to-face counseling, consulting, coordinating plan of care.      Ian Shepard MD, MD    CC:  Shaun Ewing,   "

## 2019-06-25 ENCOUNTER — HOSPITAL ENCOUNTER (OUTPATIENT)
Dept: MAMMOGRAPHY | Facility: CLINIC | Age: 74
Discharge: HOME | End: 2019-06-25
Attending: INTERNAL MEDICINE | Admitting: INTERNAL MEDICINE
Payer: MEDICARE

## 2019-06-25 ENCOUNTER — HOSPITAL ENCOUNTER (OUTPATIENT)
Dept: MAMMOGRAPHY | Facility: CLINIC | Age: 74
End: 2019-06-25
Attending: INTERNAL MEDICINE
Payer: MEDICARE

## 2019-06-25 DIAGNOSIS — R92.8 BI-RADS CATEGORY 3 MAMMOGRAM RESULT: ICD-10-CM

## 2019-06-25 PROCEDURE — 77066 DX MAMMO INCL CAD BI: CPT

## 2019-06-25 PROCEDURE — G0279 TOMOSYNTHESIS, MAMMO: HCPCS

## 2019-06-25 PROCEDURE — 76642 ULTRASOUND BREAST LIMITED: CPT | Mod: RT

## 2019-12-15 ENCOUNTER — HEALTH MAINTENANCE LETTER (OUTPATIENT)
Age: 74
End: 2019-12-15

## 2020-05-12 DIAGNOSIS — G47.33 OBSTRUCTIVE SLEEP APNEA (ADULT) (PEDIATRIC): Primary | ICD-10-CM

## 2020-05-12 DIAGNOSIS — G47.33 OSA (OBSTRUCTIVE SLEEP APNEA): ICD-10-CM

## 2020-07-07 ENCOUNTER — HOSPITAL ENCOUNTER (OUTPATIENT)
Dept: MAMMOGRAPHY | Facility: CLINIC | Age: 75
End: 2020-07-07
Attending: INTERNAL MEDICINE
Payer: MEDICARE

## 2020-07-07 DIAGNOSIS — R92.8 BI-RADS CATEGORY 3 MAMMOGRAM RESULT: ICD-10-CM

## 2020-07-07 PROCEDURE — G0279 TOMOSYNTHESIS, MAMMO: HCPCS

## 2020-07-07 PROCEDURE — 76642 ULTRASOUND BREAST LIMITED: CPT | Mod: RT

## 2020-09-17 DIAGNOSIS — E78.5 HYPERLIPIDEMIA LDL GOAL <100: ICD-10-CM

## 2020-09-18 NOTE — TELEPHONE ENCOUNTER
"[employer] message sent advising due for appointment     Also - Routing to [facility] to contact patient to inform due for appointment. Please route back once scheduled. Thank you.     Routing refill request to provider for review/approval because:  Loree given x1 and patient did not follow up, please advise - pended 30 day with note     Catalina ASIF RN      Requested Prescriptions   Pending Prescriptions Disp Refills     simvastatin (ZOCOR) 40 MG tablet [Pharmacy Med Name: SIMVASTATIN  TAB 40MG] 90 tablet 0     Sig: TAKE 1 TABLET AT BEDTIME, DUE FOR VISIT (CLINIC OR VIRTUAL) FOR FURTHER REFILLS. PLEASE CALL [phone] TO SCHEDULE       Statins Protocol Failed - 9/17/2020  4:18 PM        Failed - LDL on file in past 12 months     Recent Labs   Lab Test 06/18/19  1222   LDL 54             Failed - Recent (12 mo) or future (30 days) visit within the authorizing provider's specialty     Patient has had an office visit with the authorizing provider or a provider within the authorizing providers department within the previous 12 mos or has a future within next 30 days. See \"Patient Info\" tab in inbasket, or \"Choose Columns\" in Meds & Orders section of the refill encounter.              Passed - No abnormal creatine kinase in past 12 months     No lab results found.             Passed - Medication is active on med list        Passed - Patient is age 18 or older        Passed - No active pregnancy on record        Passed - No positive pregnancy test in past 12 months             "

## 2020-09-20 RX ORDER — SIMVASTATIN 40 MG
TABLET ORAL
Qty: 30 TABLET | Refills: 0 | Status: SHIPPED | OUTPATIENT
Start: 2020-09-20 | End: 2020-10-12

## 2020-09-22 ENCOUNTER — DOCUMENTATION ONLY (OUTPATIENT)
Dept: FAMILY MEDICINE | Facility: CLINIC | Age: 75
End: 2020-09-22

## 2020-09-22 DIAGNOSIS — E78.5 HYPERLIPIDEMIA LDL GOAL <100: ICD-10-CM

## 2020-09-22 DIAGNOSIS — N18.30 CKD (CHRONIC KIDNEY DISEASE) STAGE 3, GFR 30-59 ML/MIN (H): Primary | Chronic | ICD-10-CM

## 2020-09-22 DIAGNOSIS — Z00.00 ROUTINE GENERAL MEDICAL EXAMINATION AT A HEALTH CARE FACILITY: ICD-10-CM

## 2020-09-22 DIAGNOSIS — G47.33 OSA (OBSTRUCTIVE SLEEP APNEA): ICD-10-CM

## 2020-09-22 DIAGNOSIS — Z13.29 SCREENING FOR HYPOTHYROIDISM: ICD-10-CM

## 2020-09-22 DIAGNOSIS — I12.9 BENIGN HYPERTENSION WITH CKD (CHRONIC KIDNEY DISEASE) STAGE III (H): Chronic | ICD-10-CM

## 2020-09-22 DIAGNOSIS — E66.01 MORBID OBESITY (H): Chronic | ICD-10-CM

## 2020-09-22 DIAGNOSIS — E66.01 MORBID OBESITY (H): ICD-10-CM

## 2020-09-22 DIAGNOSIS — E55.9 VITAMIN D DEFICIENCY: ICD-10-CM

## 2020-09-22 DIAGNOSIS — Z00.00 ROUTINE GENERAL MEDICAL EXAMINATION AT A HEALTH CARE FACILITY: Primary | ICD-10-CM

## 2020-09-22 DIAGNOSIS — N18.30 BENIGN HYPERTENSION WITH CKD (CHRONIC KIDNEY DISEASE) STAGE III (H): Chronic | ICD-10-CM

## 2020-09-22 DIAGNOSIS — K21.9 GASTROESOPHAGEAL REFLUX DISEASE, ESOPHAGITIS PRESENCE NOT SPECIFIED: ICD-10-CM

## 2020-09-22 DIAGNOSIS — J45.20 MILD INTERMITTENT ASTHMA WITHOUT COMPLICATION: Chronic | ICD-10-CM

## 2020-09-22 DIAGNOSIS — N18.30 CKD (CHRONIC KIDNEY DISEASE) STAGE 3, GFR 30-59 ML/MIN (H): ICD-10-CM

## 2020-09-22 DIAGNOSIS — M10.9 GOUT OF ANKLE, UNSPECIFIED CAUSE, UNSPECIFIED CHRONICITY, UNSPECIFIED LATERALITY: Chronic | ICD-10-CM

## 2020-09-28 DIAGNOSIS — Z13.29 SCREENING FOR HYPOTHYROIDISM: ICD-10-CM

## 2020-09-28 DIAGNOSIS — Z00.00 ROUTINE GENERAL MEDICAL EXAMINATION AT A HEALTH CARE FACILITY: ICD-10-CM

## 2020-09-28 DIAGNOSIS — E66.01 MORBID OBESITY (H): ICD-10-CM

## 2020-09-28 DIAGNOSIS — E55.9 VITAMIN D DEFICIENCY: ICD-10-CM

## 2020-09-28 DIAGNOSIS — N18.30 CKD (CHRONIC KIDNEY DISEASE) STAGE 3, GFR 30-59 ML/MIN (H): ICD-10-CM

## 2020-09-28 DIAGNOSIS — E78.5 HYPERLIPIDEMIA LDL GOAL <100: ICD-10-CM

## 2020-09-28 LAB
ALBUMIN UR-MCNC: 100 MG/DL
APPEARANCE UR: CLEAR
BACTERIA #/AREA URNS HPF: ABNORMAL /HPF
BILIRUB UR QL STRIP: NEGATIVE
COLOR UR AUTO: YELLOW
ERYTHROCYTE [DISTWIDTH] IN BLOOD BY AUTOMATED COUNT: 12.7 % (ref 10–15)
GLUCOSE UR STRIP-MCNC: NEGATIVE MG/DL
HCT VFR BLD AUTO: 38.4 % (ref 35–47)
HGB BLD-MCNC: 12.8 G/DL (ref 11.7–15.7)
HGB UR QL STRIP: NEGATIVE
KETONES UR STRIP-MCNC: NEGATIVE MG/DL
LEUKOCYTE ESTERASE UR QL STRIP: NEGATIVE
MCH RBC QN AUTO: 31.2 PG (ref 26.5–33)
MCHC RBC AUTO-ENTMCNC: 33.3 G/DL (ref 31.5–36.5)
MCV RBC AUTO: 94 FL (ref 78–100)
NITRATE UR QL: NEGATIVE
PH UR STRIP: 5 PH (ref 5–7)
PLATELET # BLD AUTO: 320 10E9/L (ref 150–450)
RBC # BLD AUTO: 4.1 10E12/L (ref 3.8–5.2)
RBC #/AREA URNS AUTO: ABNORMAL /HPF
SOURCE: ABNORMAL
SP GR UR STRIP: 1.02 (ref 1–1.03)
UROBILINOGEN UR STRIP-ACNC: 0.2 EU/DL (ref 0.2–1)
WBC # BLD AUTO: 7.4 10E9/L (ref 4–11)
WBC #/AREA URNS AUTO: ABNORMAL /HPF

## 2020-09-28 PROCEDURE — 84443 ASSAY THYROID STIM HORMONE: CPT | Performed by: INTERNAL MEDICINE

## 2020-09-28 PROCEDURE — 81001 URINALYSIS AUTO W/SCOPE: CPT | Performed by: INTERNAL MEDICINE

## 2020-09-28 PROCEDURE — 36415 COLL VENOUS BLD VENIPUNCTURE: CPT | Performed by: INTERNAL MEDICINE

## 2020-09-28 PROCEDURE — 80061 LIPID PANEL: CPT | Performed by: INTERNAL MEDICINE

## 2020-09-28 PROCEDURE — 85027 COMPLETE CBC AUTOMATED: CPT | Performed by: INTERNAL MEDICINE

## 2020-09-28 PROCEDURE — 82306 VITAMIN D 25 HYDROXY: CPT | Performed by: INTERNAL MEDICINE

## 2020-09-28 PROCEDURE — 80053 COMPREHEN METABOLIC PANEL: CPT | Performed by: INTERNAL MEDICINE

## 2020-09-28 PROCEDURE — 84439 ASSAY OF FREE THYROXINE: CPT | Performed by: INTERNAL MEDICINE

## 2020-09-29 ENCOUNTER — OFFICE VISIT (OUTPATIENT)
Dept: FAMILY MEDICINE | Facility: CLINIC | Age: 75
End: 2020-09-29
Payer: MEDICARE

## 2020-09-29 VITALS
SYSTOLIC BLOOD PRESSURE: 158 MMHG | TEMPERATURE: 97.7 F | WEIGHT: 229 LBS | HEIGHT: 65 IN | OXYGEN SATURATION: 95 % | DIASTOLIC BLOOD PRESSURE: 87 MMHG | HEART RATE: 84 BPM | BODY MASS INDEX: 38.15 KG/M2

## 2020-09-29 DIAGNOSIS — J45.20 MILD INTERMITTENT ASTHMA WITHOUT COMPLICATION: ICD-10-CM

## 2020-09-29 DIAGNOSIS — N18.30 BENIGN HYPERTENSION WITH CKD (CHRONIC KIDNEY DISEASE) STAGE III (H): ICD-10-CM

## 2020-09-29 DIAGNOSIS — I12.9 BENIGN HYPERTENSION WITH CKD (CHRONIC KIDNEY DISEASE) STAGE III (H): ICD-10-CM

## 2020-09-29 DIAGNOSIS — Z23 NEED FOR PROPHYLACTIC VACCINATION AND INOCULATION AGAINST INFLUENZA: Primary | ICD-10-CM

## 2020-09-29 DIAGNOSIS — J30.2 SEASONAL ALLERGIC RHINITIS, UNSPECIFIED TRIGGER: ICD-10-CM

## 2020-09-29 DIAGNOSIS — G47.33 OSA (OBSTRUCTIVE SLEEP APNEA): ICD-10-CM

## 2020-09-29 DIAGNOSIS — M10.9 GOUT OF ANKLE, UNSPECIFIED CAUSE, UNSPECIFIED CHRONICITY, UNSPECIFIED LATERALITY: ICD-10-CM

## 2020-09-29 DIAGNOSIS — E78.5 HYPERLIPIDEMIA LDL GOAL <100: ICD-10-CM

## 2020-09-29 DIAGNOSIS — N18.31 STAGE 3A CHRONIC KIDNEY DISEASE (H): ICD-10-CM

## 2020-09-29 DIAGNOSIS — K21.9 GASTROESOPHAGEAL REFLUX DISEASE WITHOUT ESOPHAGITIS: ICD-10-CM

## 2020-09-29 LAB
ALBUMIN SERPL-MCNC: 3.5 G/DL (ref 3.4–5)
ALP SERPL-CCNC: 148 U/L (ref 40–150)
ALT SERPL W P-5'-P-CCNC: 87 U/L (ref 0–50)
ANION GAP SERPL CALCULATED.3IONS-SCNC: 8 MMOL/L (ref 3–14)
AST SERPL W P-5'-P-CCNC: 47 U/L (ref 0–45)
BILIRUB SERPL-MCNC: 0.4 MG/DL (ref 0.2–1.3)
BUN SERPL-MCNC: 21 MG/DL (ref 7–30)
CALCIUM SERPL-MCNC: 9.3 MG/DL (ref 8.5–10.1)
CHLORIDE SERPL-SCNC: 106 MMOL/L (ref 94–109)
CHOLEST SERPL-MCNC: 137 MG/DL
CO2 SERPL-SCNC: 23 MMOL/L (ref 20–32)
CREAT SERPL-MCNC: 1.12 MG/DL (ref 0.52–1.04)
DEPRECATED CALCIDIOL+CALCIFEROL SERPL-MC: 29 UG/L (ref 20–75)
GFR SERPL CREATININE-BSD FRML MDRD: 48 ML/MIN/{1.73_M2}
GLUCOSE SERPL-MCNC: 113 MG/DL (ref 70–99)
HDLC SERPL-MCNC: 47 MG/DL
LDLC SERPL CALC-MCNC: 55 MG/DL
NONHDLC SERPL-MCNC: 90 MG/DL
POTASSIUM SERPL-SCNC: 4.4 MMOL/L (ref 3.4–5.3)
PROT SERPL-MCNC: 8 G/DL (ref 6.8–8.8)
SODIUM SERPL-SCNC: 136 MMOL/L (ref 133–144)
T4 FREE SERPL-MCNC: 1.18 NG/DL (ref 0.76–1.46)
TRIGL SERPL-MCNC: 175 MG/DL
TSH SERPL DL<=0.005 MIU/L-ACNC: 4.32 MU/L (ref 0.4–4)

## 2020-09-29 PROCEDURE — G0008 ADMIN INFLUENZA VIRUS VAC: HCPCS | Performed by: INTERNAL MEDICINE

## 2020-09-29 PROCEDURE — G0439 PPPS, SUBSEQ VISIT: HCPCS | Performed by: INTERNAL MEDICINE

## 2020-09-29 PROCEDURE — 90662 IIV NO PRSV INCREASED AG IM: CPT | Performed by: INTERNAL MEDICINE

## 2020-09-29 ASSESSMENT — ACTIVITIES OF DAILY LIVING (ADL): CURRENT_FUNCTION: NO ASSISTANCE NEEDED

## 2020-09-29 ASSESSMENT — MIFFLIN-ST. JEOR: SCORE: 1526.68

## 2020-09-29 NOTE — PROGRESS NOTES
"SUBJECTIVE:   Oksana Jerez is a 74 year old female who presents for Preventive Visit.      Patient has been advised of split billing requirements and indicates understanding: Yes   Are you in the first 12 months of your Medicare coverage?  No    Healthy Habits:     In general, how would you rate your overall health?  Fair    Frequency of exercise:  None    Do you usually eat at least 4 servings of fruit and vegetables a day, include whole grains    & fiber and avoid regularly eating high fat or \"junk\" foods?  No    Taking medications regularly:  No    Barriers to taking medications:  None    Medication side effects:  None    Ability to successfully perform activities of daily living:  No assistance needed    Home Safety:  No safety concerns identified    Hearing Impairment:  No hearing concerns    In the past 6 months, have you been bothered by leaking of urine? Yes    In general, how would you rate your overall mental or emotional health?  Good      PHQ-2 Total Score: 0    Additional concerns today:  No    Do you feel safe in your environment? Yes    Have you ever done Advance Care Planning? (For example, a Health Directive, POLST, or a discussion with a medical provider or your loved ones about your wishes): Yes, patient states has an Advance Care Planning document and will bring a copy to the clinic.      Fall risk  Fallen 2 or more times in the past year?: No  Any fall with injury in the past year?: No    Cognitive Screening   1) Repeat 3 items (Leader, Season, Table)    2) Clock draw: NORMAL  3) 3 item recall: Recalls 3 objects  Results: 3 items recalled: COGNITIVE IMPAIRMENT LESS LIKELY    Mini-CogTM Copyright NESTOR Reddy. Licensed by the author for use in [facility]; reprinted with permission ([email or web address]). All rights reserved.      Do you have sleep apnea, excessive snoring or daytime drowsiness?: yes    Reviewed and updated as needed this visit by clinical staff  Tobacco  Allergies  Meds  " Med Hx  Surg Hx  Fam Hx  Soc Hx      Chronic dyspnea with stairs unchanged  Reviewed and updated as needed this visit by Provider        Social History     Tobacco Use     Smoking status: Former Smoker     Last attempt to quit: 1974     Years since quittin.7     Smokeless tobacco: Never Used   Substance Use Topics     Alcohol use: Yes     Alcohol/week: 0.0 standard drinks     Frequency: 2-4 times a month     Drinks per session: 1 or 2     Binge frequency: Never     Comment: 1-2 drinks weekly     If you drink alcohol do you typically have >3 drinks per day or >7 drinks per week? No    Alcohol Use 2019   Prescreen: >3 drinks/day or >7 drinks/week? No               Current providers sharing in care for this patient include:   Patient Care Team:  Shaun Ewing MD as PCP - General (Internal Medicine)  Shaun Ewing MD as Assigned PCP    The following health maintenance items are reviewed in Epic and correct as of today:  Health Maintenance   Topic Date Due     ASTHMA ACTION PLAN  2019     ADVANCE CARE PLANNING  10/22/2019     ASTHMA CONTROL TEST  2019     PHQ-2  2020     MEDICARE ANNUAL WELLNESS VISIT  2020     FALL RISK ASSESSMENT  2020     INFLUENZA VACCINE (1) 2020     ZOSTER IMMUNIZATION (3 of 3) 10/13/2020     MAMMO SCREENING  2022     COLORECTAL CANCER SCREENING  2024     DTAP/TDAP/TD IMMUNIZATION (3 - Td) 2025     LIPID  2025     DEXA  Completed     PNEUMOCOCCAL IMMUNIZATION 65+ LOW/MEDIUM RISK  Completed     IPV IMMUNIZATION  Aged Out     MENINGITIS IMMUNIZATION  Aged Out     HEPATITIS B IMMUNIZATION  Aged Out     Lab work is in process      Review of Systems  CONSTITUTIONAL: NEGATIVE for fever, chills, change in weight  INTEGUMENTARY/SKIN: NEGATIVE for worrisome rashes, moles or lesions  EYES: NEGATIVE for vision changes or irritation  ENT/MOUTH: NEGATIVE for ear, mouth and throat problems  RESP: NEGATIVE for significant cough or  "SOB  BREAST: NEGATIVE for masses, tenderness or discharge  CV: NEGATIVE for chest pain, palpitations or peripheral edema  GI: NEGATIVE for nausea, abdominal pain, heartburn, or change in bowel habits  : NEGATIVE for frequency, dysuria, or hematuria  MUSCULOSKELETAL: NEGATIVE for significant arthralgias or myalgia  NEURO: NEGATIVE for weakness, dizziness or paresthesias  ENDOCRINE: NEGATIVE for temperature intolerance, skin/hair changes  HEME: NEGATIVE for bleeding problems  PSYCHIATRIC: NEGATIVE for changes in mood or affect    OBJECTIVE:   BP (!) 158/87 (BP Location: Right arm, Patient Position: Sitting, Cuff Size: Adult Large)   Pulse 84   Temp 97.7  F (36.5  C) (Skin)   Ht 1.638 m (5' 4.5\")   Wt 103.9 kg (229 lb)   SpO2 95%   BMI 38.70 kg/m   Estimated body mass index is 38.7 kg/m  as calculated from the following:    Height as of this encounter: 1.638 m (5' 4.5\").    Weight as of this encounter: 103.9 kg (229 lb).  Physical Exam  GENERAL APPEARANCE: healthy, alert and no distress  EYES: Eyes grossly normal to inspection, PERRL and conjunctivae and sclerae normal  HENT: ear canals and TM's normal, nose and mouth without ulcers or lesions, oropharynx clear and oral mucous membranes moist  NECK: no adenopathy, no asymmetry, masses, or scars and thyroid normal to palpation  RESP: lungs clear to auscultation - no rales, rhonchi or wheezes  BREAST: normal without masses, tenderness or nipple discharge and no palpable axillary masses or adenopathy  CV: regular rate and rhythm, normal S1 S2, no S3 or S4, no murmur, click or rub, no peripheral edema and peripheral pulses strong  ABDOMEN: soft, nontender, no hepatosplenomegaly, no masses and bowel sounds normal  MS: no musculoskeletal defects are noted and gait is age appropriate without ataxia  SKIN: no suspicious lesions or rashes  NEURO: Normal strength and tone, sensory exam grossly normal, mentation intact and speech normal  PSYCH: mentation appears normal " "and affect normal/bright          1. Need for prophylactic vaccination and inoculation against influenza    - FLUZONE HIGH DOSE 65+  [15762]  - ADMIN INFLUENZA (For MEDICARE Patients ONLY) []    2. Mild intermittent asthma without complication  Reviewed, reevaluate pulmonary functions at a later date    3. Seasonal allergic rhinitis, unspecified trigger      4. Gastroesophageal reflux disease without esophagitis  Well-controlled with current therapy    5. Stage 3a chronic kidney disease    Follow-up fasting labs  6. Gout of ankle, unspecified cause, unspecified chronicity, unspecified laterality  Follow-up labs    7. TENA (obstructive sleep apnea)      8. Hyperlipidemia  Follow-up labs fasting    9. Benign hypertension with CKD (chronic kidney disease) stage III  Reevaluate blood pressure    Patient has been advised of split billing requirements and indicates understanding: Yes  COUNSELING:  Reviewed preventive health counseling, as reflected in patient instructions    Estimated body mass index is 38.7 kg/m  as calculated from the following:    Height as of this encounter: 1.638 m (5' 4.5\").    Weight as of this encounter: 103.9 kg (229 lb).        She reports that she quit smoking about 46 years ago. She has never used smokeless tobacco.      Appropriate preventive services were discussed with this patient, including applicable screening as appropriate for cardiovascular disease, diabetes, osteopenia/osteoporosis, and glaucoma.  As appropriate for age/gender, discussed screening for colorectal cancer, prostate cancer, breast cancer, and cervical cancer. Checklist reviewing preventive services available has been given to the patient.    Reviewed patients plan of care and provided an AVS. The Basic Care Plan (routine screening as documented in Health Maintenance) for Oksana meets the Care Plan requirement. This Care Plan has been established and reviewed with the Patient.    Counseling Resources:  ATP IV " Guidelines  Pooled Cohorts Equation Calculator  Breast Cancer Risk Calculator  Breast Cancer: Medication to Reduce Risk  FRAX Risk Assessment  ICSI Preventive Guidelines  Dietary Guidelines for Americans, 2010  USDA's MyPlate  ASA Prophylaxis  Lung CA Screening    Shaun Ewing MD  [facility]    Identified Health Risks:

## 2020-09-29 NOTE — PROGRESS NOTES
Prior to immunization administration, verified patients identity using patient s name and date of birth. Please see Immunization Activity for additional information.     Screening Questionnaire for Adult Immunization    Are you sick today?   No   Do you have allergies to medications, food, a vaccine component or latex?   No   Have you ever had a serious reaction after receiving a vaccination?   No   Do you have a long-term health problem with heart, lung, kidney, or metabolic disease (e.g., diabetes), asthma, a blood disorder, no spleen, complement component deficiency, a cochlear implant, or a spinal fluid leak?  Are you on long-term aspirin therapy?   Yes, asthma   Do you have cancer, leukemia, HIV/AIDS, or any other immune system problem?   No   Do you have a parent, brother, or sister with an immune system problem?   Yes, Leukemia( brother), sister (RA)   In the past 3 months, have you taken medications that affect  your immune system, such as prednisone, other steroids, or anticancer drugs; drugs for the treatment of rheumatoid arthritis, Crohn s disease, or psoriasis; or have you had radiation treatments?   No   Have you had a seizure, or a brain or other nervous system problem?   No   During the past year, have you received a transfusion of blood or blood    products, or been given immune (gamma) globulin or antiviral drug?   No   For women: Are you pregnant or is there a chance you could become       pregnant during the next month?   No   Have you received any vaccinations in the past 4 weeks?   No     Immunization questionnaire was positive for at least one answer.         Per orders of Dr. Ewing, injection of Flu vaccine given by Nereyda Morales MA. Patient instructed to remain in clinic for 15 minutes afterwards, and to report any adverse reaction to me immediately.       Screening performed by Nereyda Morales MA on 9/29/2020 at 5:46 PM.

## 2020-09-30 ASSESSMENT — ASTHMA QUESTIONNAIRES: ACT_TOTALSCORE: 18

## 2020-10-03 DIAGNOSIS — N18.30 BENIGN HYPERTENSION WITH CKD (CHRONIC KIDNEY DISEASE) STAGE III (H): ICD-10-CM

## 2020-10-03 DIAGNOSIS — I12.9 BENIGN HYPERTENSION WITH CKD (CHRONIC KIDNEY DISEASE) STAGE III (H): ICD-10-CM

## 2020-10-05 NOTE — TELEPHONE ENCOUNTER
Routing refill request to provider for review/approval because:  Labs out of range:  Creatinine    Please review and authorize if appropriate.    Thank you,   Sabas BALL RN

## 2020-10-06 RX ORDER — LOSARTAN POTASSIUM 25 MG/1
TABLET ORAL
Qty: 90 TABLET | Refills: 0 | Status: SHIPPED | OUTPATIENT
Start: 2020-10-06 | End: 2020-12-24

## 2020-10-11 DIAGNOSIS — E78.5 HYPERLIPIDEMIA LDL GOAL <100: ICD-10-CM

## 2020-10-12 RX ORDER — SIMVASTATIN 40 MG
40 TABLET ORAL AT BEDTIME
Qty: 90 TABLET | Refills: 3 | Status: SHIPPED | OUTPATIENT
Start: 2020-10-12 | End: 2021-06-15

## 2020-10-13 ENCOUNTER — VIRTUAL VISIT (OUTPATIENT)
Dept: FAMILY MEDICINE | Facility: OTHER | Age: 75
End: 2020-10-13
Payer: COMMERCIAL

## 2020-10-13 DIAGNOSIS — Z20.822 SUSPECTED COVID-19 VIRUS INFECTION: Primary | ICD-10-CM

## 2020-10-13 PROCEDURE — 99421 OL DIG E/M SVC 5-10 MIN: CPT | Performed by: FAMILY MEDICINE

## 2020-10-13 NOTE — PROGRESS NOTES
"Date: 10/13/2020 12:45:13  Clinician: Mahin Boothe  Clinician NPI: [id number]  Patient: NOELLE HU  Patient : 1945  Patient Address: [address] [place] [address] [address], [place] [address]  Patient Phone: [phone]  Visit Protocol: URI  Patient Summary:  NOELLE is a 75 year old ( : 1945 ) female who initiated a [facility] Visit for cold, sinus infection, or influenza. When asked the question \"Please sign me up to receive news, health information and promotions from [facility].\", NOELLE responded \"Yes\".    NOELLE states her symptoms started gradually 7-9 days ago. After her symptoms started, they improved and then got worse again.   Her symptoms consist of a cough, nasal congestion, and rhinitis.   Symptom details     Nasal secretions: The color of her mucus is white.    Cough: NOELLE coughs a few times an hour and her cough is more bothersome at night. Phlegm comes into her throat when she coughs. She believes her cough is caused by post-nasal drip. The color of the phlegm is clear and white.      NOELLE denies having ear pain, headache, wheezing, fever, enlarged lymph nodes, anosmia, vomiting, nausea, facial pain or pressure, myalgias, chills, malaise, sore throat, teeth pain, ageusia, and diarrhea. She also denies taking antibiotic medication in the past month and having recent facial or sinus surgery in the past 60 days. She is not experiencing dyspnea.   Precipitating events  She has not recently been exposed to someone with influenza. NOELLE has not been in close contact with any high risk individuals.   Pertinent COVID-19 (Coronavirus) information  In the past 14 days, NOELLE has not worked in a congregate living setting.   She does not work or volunteer as healthcare worker or a  and does not work or volunteer in a healthcare facility.   NOELLE also has not lived in a congregate living setting in the past 14 days. She does not live with a healthcare worker.   NOELLE has " not had a close contact with a laboratory-confirmed COVID-19 patient within 14 days of symptom onset.   Since December 2019, NOELLE and has not had upper respiratory infection or influenza-like illness. Has not been diagnosed with lab-confirmed COVID-19 test   Pertinent medical history  NOELLE does not get yeast infections when she takes antibiotics.   NOELLE does not need a return to work/school note.   Weight: 230 lbs   NOELLE does not smoke or use smokeless tobacco.   Additional information as reported by the patient (free text): I feel this is just a head cold but thought I should investigate whether I should be tested for covid.  No contact with anyone who has, that I know of.  Hard time sleeping at night due to intermittent cough.  Better when sitting in chair.  No problem breathing other than stuffy nose.  Th as no you.   Weight: 230 lbs    MEDICATIONS: Aspirin Low Dose oral, esomeprazole magnesium oral, Multiple Vitamins oral, Citrus Calcium-Vitamin D3 oral, albuterol sulfate inhalation, Flovent HFA inhalation, allopurinol oral, losartan-hydrochlorothiazide oral, simvastatin oral, ALLERGIES: NKDA  Clinician Response:  Dear NOELLE,   Your symptoms show that you may have coronavirus (COVID-19). This illness can cause fever, cough and trouble breathing. Many people get a mild case and get better on their own. Some people can get very sick.  What should I do?  We would like to test you for this virus.   1. Please call [phone] to schedule your visit. Explain [facility]. Be ready to share your [facility] visit ID number.  The following will serve as your written order for this COVID Test, ordered by me, for the indication of suspected COVID [Z20.828]: The test will be ordered in [employer], our electronic health record, after you are scheduled. It will show as ordered and authorized by Scotty Patel MD.  Order: COVID-19 (Coronavirus) PCR for SYMPTOMATIC testing from  "[facility].      2. When it's time for your COVID test:  Stay at least 6 feet away from others. (If someone will drive you to your test, stay in the backseat, as far away from the  as you can.)   Cover your mouth and nose with a mask, tissue or washcloth.  Go straight to the testing site. Don't make any stops on the way there or back.      3.Starting now: Stay home and away from others (self-isolate) until:   You've had no fever---and no medicine that reduces fever---for one full day (24 hours). And...   Your other symptoms have gotten better. For example, your cough or breathing has improved. And...   At least 10 days have passed since your symptoms started.       During this time, don't leave the house except for testing or medical care.   Stay in your own room, even for meals. Use your own bathroom if you can.   Stay away from others in your home. No hugging, kissing or shaking hands. No visitors.  Don't go to work, school or anywhere else.    Clean \"high touch\" surfaces often (doorknobs, counters, handles, etc.). Use a household cleaning spray or wipes. You'll find a full list of  on the EPA website: www.epa.gov/pesticide-registration/list-n-disinfectants-use-against-sars-cov-2.   Cover your mouth and nose with a mask, tissue or washcloth to avoid spreading germs.  Wash your hands and face often. Use soap and water.  Caregivers in these groups are at risk for severe illness due to COVID-19:  o People 65 years and older  o People who live in a nursing home or long-term care facility  o People with chronic disease (lung, heart, cancer, diabetes, kidney, liver, immunologic)  o People who have a weakened immune system, including those who:   Are in cancer treatment  Take medicine that weakens the immune system, such as corticosteroids  Had a bone marrow or organ transplant  Have an immune deficiency  Have poorly controlled HIV or AIDS  Are obese (body mass index of 40 or higher)  Smoke regularly   o " Caregivers should wear gloves while washing dishes, handling laundry and cleaning bedrooms and bathrooms.  o Use caution when washing and drying laundry: Don't shake dirty laundry, and use the warmest water setting that you can.  o For more tips, go to www.cdc.gov/coronavirus/2019-ncov/downloads/10Things.pdf.    4.Sign up for [employer]. We know it's scary to hear that you might have COVID-19. We want to track your symptoms to make sure you're okay over the next 2 weeks. Please look for an email from [employer]---this is a free, online program that we'll use to keep in touch. To sign up, follow the link in the email. Learn more at [email or web address]  How can I take care of myself?   Get lots of rest. Drink extra fluids (unless a doctor has told you not to).   Take Tylenol (acetaminophen) for fever or pain. If you have liver or kidney problems, ask your family doctor if it's okay to take Tylenol.   Adults can take either:    650 mg (two 325 mg pills) every 4 to 6 hours, or...   1,000 mg (two 500 mg pills) every 8 hours as needed.    Note: Don't take more than 3,000 mg in one day. Acetaminophen is found in many medicines (both prescribed and over-the-counter medicines). Read all labels to be sure you don't take too much.   For children, check the Tylenol bottle for the right dose. The dose is based on the child's age or weight.    If you have other health problems (like cancer, heart failure, an organ transplant or severe kidney disease): Call your specialty clinic if you don't feel better in the next 2 days.       Know when to call 911. Emergency warning signs include:    Trouble breathing or shortness of breath Pain or pressure in the chest that doesn't go away Feeling confused like you haven't felt before, or not being able to wake up Bluish-colored lips or face.  Where can I get more information?    [employer] -- About COVID-19: [email or web address]   CDC -- What to Do If  You're Sick: www.cdc.gov/coronavirus/2019-ncov/about/steps-when-sick.html   CDC -- Ending Home Isolation: www.cdc.gov/coronavirus/2019-ncov/hcp/disposition-in-home-patients.html   [facility] -- Caring for Someone: www.cdc.gov/coronavirus/2019-ncov/if-you-are-sick/care-for-someone.html   [facility] -- Interim Guidance for Hospital Discharge to Home: www.[facility].[place]./diseases/coronavirus/hcp/hospdischarge.pdf   [facility] clinical trials (COVID-19 research studies): clinicalaffairs.[facility]-clinical-trials    Below are the COVID-19 hotlines at the [place] Department of [facility] ([facility]). Interpreters are available.    For health questions: Call 397-388-5602 or 1-898.211.5598 (7 a.m. to 7 p.m.) For questions about schools and childcare: Call 896-547-0842 or 1-551.915.3930 (7 a.m. to 7 p.m.)    Diagnosis: Acute upper respiratory infection, unspecified  Diagnosis ICD: J06.9

## 2020-10-14 DIAGNOSIS — Z20.822 SUSPECTED COVID-19 VIRUS INFECTION: ICD-10-CM

## 2020-10-14 LAB
SARS-COV-2 RNA SPEC QL NAA+PROBE: NOT DETECTED
SPECIMEN SOURCE: NORMAL

## 2020-10-14 PROCEDURE — U0003 INFECTIOUS AGENT DETECTION BY NUCLEIC ACID (DNA OR RNA); SEVERE ACUTE RESPIRATORY SYNDROME CORONAVIRUS 2 (SARS-COV-2) (CORONAVIRUS DISEASE [COVID-19]), AMPLIFIED PROBE TECHNIQUE, MAKING USE OF HIGH THROUGHPUT TECHNOLOGIES AS DESCRIBED BY CMS-2020-01-R: HCPCS | Performed by: FAMILY MEDICINE

## 2020-10-26 ENCOUNTER — MYC REFILL (OUTPATIENT)
Dept: FAMILY MEDICINE | Facility: CLINIC | Age: 75
End: 2020-10-26

## 2020-10-26 DIAGNOSIS — J45.20 MILD INTERMITTENT ASTHMA WITHOUT COMPLICATION: ICD-10-CM

## 2020-10-27 NOTE — TELEPHONE ENCOUNTER
Routing refill request to provider for review/approval because:  ACT out of range/date   Cheri Rosas RN

## 2020-10-29 RX ORDER — ALBUTEROL SULFATE 90 UG/1
2 AEROSOL, METERED RESPIRATORY (INHALATION) EVERY 6 HOURS PRN
Qty: 3 INHALER | Refills: 3 | Status: SHIPPED | OUTPATIENT
Start: 2020-10-29 | End: 2021-06-15

## 2020-12-22 DIAGNOSIS — N18.30 BENIGN HYPERTENSION WITH CKD (CHRONIC KIDNEY DISEASE) STAGE III (H): ICD-10-CM

## 2020-12-22 DIAGNOSIS — I12.9 BENIGN HYPERTENSION WITH CKD (CHRONIC KIDNEY DISEASE) STAGE III (H): ICD-10-CM

## 2020-12-23 NOTE — TELEPHONE ENCOUNTER
Routing refill request to provider for review/approval because:  Labs out of range:  Cr, BP    TOMEKA MillsN, RN  [employer] [facility]

## 2020-12-24 RX ORDER — LOSARTAN POTASSIUM 25 MG/1
TABLET ORAL
Qty: 90 TABLET | Refills: 0 | Status: SHIPPED | OUTPATIENT
Start: 2020-12-24 | End: 2021-03-17

## 2021-06-15 ENCOUNTER — OFFICE VISIT (OUTPATIENT)
Dept: FAMILY MEDICINE | Facility: CLINIC | Age: 76
End: 2021-06-15
Payer: MEDICARE

## 2021-06-15 VITALS
DIASTOLIC BLOOD PRESSURE: 84 MMHG | SYSTOLIC BLOOD PRESSURE: 126 MMHG | HEART RATE: 82 BPM | BODY MASS INDEX: 37.4 KG/M2 | TEMPERATURE: 97.1 F | WEIGHT: 221.3 LBS | OXYGEN SATURATION: 94 %

## 2021-06-15 DIAGNOSIS — N18.30 BENIGN HYPERTENSION WITH CKD (CHRONIC KIDNEY DISEASE) STAGE III (H): ICD-10-CM

## 2021-06-15 DIAGNOSIS — R06.09 DYSPNEA ON EXERTION: ICD-10-CM

## 2021-06-15 DIAGNOSIS — Z76.89 ENCOUNTER TO ESTABLISH CARE: ICD-10-CM

## 2021-06-15 DIAGNOSIS — M10.9 GOUT OF ANKLE, UNSPECIFIED CAUSE, UNSPECIFIED CHRONICITY, UNSPECIFIED LATERALITY: ICD-10-CM

## 2021-06-15 DIAGNOSIS — E78.5 HYPERLIPIDEMIA LDL GOAL <100: ICD-10-CM

## 2021-06-15 DIAGNOSIS — I12.9 BENIGN HYPERTENSION WITH CKD (CHRONIC KIDNEY DISEASE) STAGE III (H): ICD-10-CM

## 2021-06-15 DIAGNOSIS — J45.20 MILD INTERMITTENT ASTHMA WITHOUT COMPLICATION: ICD-10-CM

## 2021-06-15 DIAGNOSIS — Z76.0 ENCOUNTER FOR MEDICATION REFILL: Primary | ICD-10-CM

## 2021-06-15 PROCEDURE — 99214 OFFICE O/P EST MOD 30 MIN: CPT | Performed by: INTERNAL MEDICINE

## 2021-06-15 RX ORDER — ALLOPURINOL 100 MG/1
100 TABLET ORAL DAILY
Qty: 90 TABLET | Refills: 3 | Status: SHIPPED | OUTPATIENT
Start: 2021-06-15 | End: 2022-03-16

## 2021-06-15 RX ORDER — ALBUTEROL SULFATE 90 UG/1
2 AEROSOL, METERED RESPIRATORY (INHALATION) EVERY 6 HOURS PRN
Qty: 18 G | Refills: 3 | Status: SHIPPED | OUTPATIENT
Start: 2021-06-15 | End: 2022-03-16

## 2021-06-15 RX ORDER — LOSARTAN POTASSIUM 25 MG/1
TABLET ORAL
Qty: 90 TABLET | Refills: 0 | Status: SHIPPED | OUTPATIENT
Start: 2021-06-15 | End: 2021-09-20

## 2021-06-15 RX ORDER — DEXAMETHASONE 4 MG/1
1 TABLET ORAL 2 TIMES DAILY
Qty: 12 G | Refills: 3 | Status: SHIPPED | OUTPATIENT
Start: 2021-06-15 | End: 2022-03-16

## 2021-06-15 RX ORDER — SIMVASTATIN 40 MG
40 TABLET ORAL AT BEDTIME
Qty: 90 TABLET | Refills: 3 | Status: SHIPPED | OUTPATIENT
Start: 2021-06-15 | End: 2022-03-16

## 2021-06-15 NOTE — PROGRESS NOTES
Assessment & Plan     Mild intermittent asthma without complication  Patient has gotten into a pattern of taking Ventolin in the evening whether she feels she needs it or not.  Patient states that the usual exacerbate her's of her asthma are physical activity and seasonal change.  I mentioned to her that around these periods of time it may be prudent that she is completely compliant with her Flovent.  She has been taking her Flovent somewhat irregularly.  Does note slight falloff in exertional tolerance.  She has a strong family history of coronary artery disease.  It may be prudent to obtain a stress echo to rule out myocardium at risk    - albuterol (VENTOLIN HFA) 108 (90 Base) MCG/ACT inhaler; Inhale 2 puffs into the lungs every 6 hours as needed for shortness of breath / dyspnea or wheezing  - fluticasone (FLOVENT HFA) 110 MCG/ACT inhaler; Inhale 1 puff into the lungs 2 times daily    Gout of ankle, unspecified cause, unspecified chronicity, unspecified laterality  No recent exacerbations.  We will reassess her uric acid level when she returns for physical examination in September  - allopurinol (ZYLOPRIM) 100 MG tablet; Take 1 tablet (100 mg) by mouth daily    Benign hypertension with CKD (chronic kidney disease) stage III  Reasonable control slightly elevated in the last several days.  She does admit to some water retention during these periods as well no chest pain  - losartan (COZAAR) 25 MG tablet; TAKE 1 TABLET DAILY, DUE FOR VISIT (CLINIC OR VIRTUAL) FOR FURTHER REFILLS. PLEASE CALL [phone] TO SCHEDULE    Hyperlipidemia  Refill medications  - simvastatin (ZOCOR) 40 MG tablet; Take 1 tablet (40 mg) by mouth At Bedtime    Encounter to establish care  Overall doing relatively well.    Encounter for medication refill  Refill current medication regime.  Stressed the importance of regular use of fluticasone to reduce asthmatic symptoms         BMI:   Estimated body mass index is 37.4 kg/m  as calculated  "from the following:    Height as of 9/29/20: 1.638 m (5' 4.5\").    Weight as of this encounter: 100.4 kg (221 lb 4.8 oz).   Working on diet and exercise    See Patient Instructions    Return in about 3 months (around 9/15/2021) for Routine preventive.    Mahin Howard MD  [facility] HEIDE Gandhi is a 75 year old who presents for the following health issues     HPI 75-year-old who presents to clinic for physical examination primarily to establish care and to have her medications refilled.    She does have a history of mild intermittent asthma.  She has not been taking her Flovent regularly.  She states that she takes it when she notices a little more shortness of breath.  Typical timeframes of increased shortness of breath or seasonal change in conjunction with physical activity outside.    She does have a strong family history of coronary artery disease.  She denies any chest pain.  She has 2 brothers whom were afflicted with coronary artery disease.  Both of them had anginal symptomatology prior to their myocardial infarction's.  Her mother had significant congestive heart failure.  She has noticed a slow falloff in her exertional tolerance over the course of the last 2 years    New Patient/Transfer of Care  New Patient/Transfer of Care    Review of Systems   Constitutional, HEENT, cardiovascular, pulmonary, gi and gu systems are negative, except as otherwise noted.      Objective    /84   Pulse 82   Temp 97.1  F (36.2  C) (Temporal)   Wt 100.4 kg (221 lb 4.8 oz)   SpO2 94%   Breastfeeding No   BMI 37.40 kg/m    Body mass index is 37.4 kg/m .  Physical Exam   Alert patient no apparent distress  Lungs are clear  Heart regular rate and rhythm  Extremities no edema  Affect and mood normal  Insight and judgment intact    Orders Only on 10/14/2020   Component Date Value Ref Range Status     COVID-19 Virus PCR to [facility]* 10/14/2020 Nasopharyngeal   Final     COVID-19 " Virus PCR to [facility] - Re* 10/14/2020 Not Detected   Final    Comment: Collection of multiple specimens from the same patient may be necessary to   detect the virus. The possibility of a false negative should be considered if   the patient's recent exposure or clinical presentation suggests 2019 nCOV   infection and diagnostic tests for other causes of illness are negative.   Repeat testing may be considered in this setting.  Patient sample was heat inactivated and amplified using the HDPCR SARS-CoV-2   assay ([employer] The HDPCRTM SARS-CoV-2 assay is a reverse   transcription real-time polymerase chain reaction (qRT-PCR) test intended for   the qualitative detection of nucleic acid  from SARS-CoV-2 in human nasopharyngeal swabs, oropharyngeal swabs, anterior   nasal swabs, mid-turbinate nasal swabs as well as nasal aspirate, nasal wash,   and bronchoalveolar lavage (BAL) specimens from individuals who are suspected   of COVID-19 by their healthcare provider.  A negative result does not rule out the presence of real-time PCR inhibitors   in the sp                           ecimen or COVID-19 RNA in concentrations below the limit of detection   of the assay. The possibility of a false negative should be considered if the   patients recent exposure or clinical presentation suggests COVID-19.   Additional testing or repeat testing requires consultation with the   laboratory.  Nasopharyngeal specimen is the preferred choice for swab-based SARS CoV2   testing. When collection of a nasopharyngeal swab is not possible the   following are acceptable alternatives:  an oropharyngeal (OP) specimen collected by a healthcare professional, or a   nasal mid-turbinate (NMT) swab collected by a healthcare professional or by   onsite self-collection (using a flocked tapered swab), or an anterior nares   specimen collected by a healthcare professional or by onsite self-collection   (using a round foam swab). ([facility])  Testing performed by [facility] ([facility]) [address] [facility] [address]  The test performance characteristics were determined by [facility]. It has not been   cleared or approved by the FDA.  The laboratory is regulated under the Clinical Laboratory Improvement   Amendments of 1988 (CLIA-88) as qualified to perform high-complexity testing.   This test is used for clinical purposes. It should not be regarded as   investigational or for research.

## 2021-06-16 ASSESSMENT — ASTHMA QUESTIONNAIRES: ACT_TOTALSCORE: 22

## 2021-07-14 ENCOUNTER — HOSPITAL ENCOUNTER (OUTPATIENT)
Dept: CARDIOLOGY | Facility: CLINIC | Age: 76
Discharge: HOME | End: 2021-07-14
Attending: INTERNAL MEDICINE | Admitting: INTERNAL MEDICINE
Payer: MEDICARE

## 2021-07-14 DIAGNOSIS — R06.09 DYSPNEA ON EXERTION: Primary | ICD-10-CM

## 2021-07-14 PROCEDURE — 255N000002 HC RX 255 OP 636: Performed by: INTERNAL MEDICINE

## 2021-07-14 PROCEDURE — 93016 CV STRESS TEST SUPVJ ONLY: CPT | Performed by: INTERNAL MEDICINE

## 2021-07-14 PROCEDURE — 93321 DOPPLER ECHO F-UP/LMTD STD: CPT | Mod: 26 | Performed by: INTERNAL MEDICINE

## 2021-07-14 PROCEDURE — 93350 STRESS TTE ONLY: CPT | Mod: 26 | Performed by: INTERNAL MEDICINE

## 2021-07-14 PROCEDURE — 93325 DOPPLER ECHO COLOR FLOW MAPG: CPT | Mod: 26 | Performed by: INTERNAL MEDICINE

## 2021-07-14 PROCEDURE — 999N000208 ECHO STRESS ECHOCARDIOGRAM

## 2021-07-14 PROCEDURE — 93018 CV STRESS TEST I&R ONLY: CPT | Performed by: INTERNAL MEDICINE

## 2021-07-14 RX ADMIN — HUMAN ALBUMIN MICROSPHERES AND PERFLUTREN 9 ML: 10; .22 INJECTION, SOLUTION INTRAVENOUS at 14:09

## 2021-07-16 ENCOUNTER — DOCUMENTATION ONLY (OUTPATIENT)
Dept: OTHER | Facility: CLINIC | Age: 76
End: 2021-07-16

## 2021-07-20 ASSESSMENT — SLEEP AND FATIGUE QUESTIONNAIRES
HOW LIKELY ARE YOU TO NOD OFF OR FALL ASLEEP WHILE SITTING INACTIVE IN A PUBLIC PLACE: WOULD NEVER DOZE
HOW LIKELY ARE YOU TO NOD OFF OR FALL ASLEEP WHEN YOU ARE A PASSENGER IN A CAR FOR AN HOUR WITHOUT A BREAK: WOULD NEVER DOZE
HOW LIKELY ARE YOU TO NOD OFF OR FALL ASLEEP WHILE SITTING AND READING: SLIGHT CHANCE OF DOZING
HOW LIKELY ARE YOU TO NOD OFF OR FALL ASLEEP WHILE WATCHING TV: SLIGHT CHANCE OF DOZING
HOW LIKELY ARE YOU TO NOD OFF OR FALL ASLEEP WHILE SITTING AND TALKING TO SOMEONE: WOULD NEVER DOZE
HOW LIKELY ARE YOU TO NOD OFF OR FALL ASLEEP IN A CAR, WHILE STOPPED FOR A FEW MINUTES IN TRAFFIC: WOULD NEVER DOZE
HOW LIKELY ARE YOU TO NOD OFF OR FALL ASLEEP WHILE SITTING QUIETLY AFTER LUNCH WITHOUT ALCOHOL: SLIGHT CHANCE OF DOZING
HOW LIKELY ARE YOU TO NOD OFF OR FALL ASLEEP WHILE LYING DOWN TO REST IN THE AFTERNOON WHEN CIRCUMSTANCES PERMIT: SLIGHT CHANCE OF DOZING

## 2021-07-21 VITALS — WEIGHT: 220 LBS | HEIGHT: 65 IN | BODY MASS INDEX: 36.65 KG/M2

## 2021-07-21 ASSESSMENT — MIFFLIN-ST. JEOR: SCORE: 1488.79

## 2021-07-21 NOTE — PATIENT INSTRUCTIONS
Your sleep apnea treatment may be affected by device recall    Our records show that you may have a Na Respironics CPAP for the treatment of sleep apnea. Many of these devices have been recalled* by the  for replacement. [facility] recommends:     1) If you are using a Resmed device, continue using the device.  2) If you have a Na Respironics device, register your device for confirmation of type of device and repair of the device at [email or web address] -if you cannot use link, call [phone].  The website will assist you in obtaining the serial number for registration.   3) If you are using a Na Respironics CPAP or Bilevel PAP device and you do not have immediate breathing, driving or cardiovascular risks without the device, consider stopping use of the device after verification that is has been recalled. Discuss this decision with your medical provider if you are uncertain about your medical risks.  4) If you are not using Respironics CPAP but are using a Respironics advanced device for breathing support (AVAPS, ASV, Bilevel PAP), continue using the device and review 5 and 6 below).     5) If you continue the device, do not include ozone generating  connected to PAP devices.  6) f you continue the device, you may choose to use a bacterial filter to reduce particulates from the device although on CPAP devices, pressures may need to be increased with the filter in place. These filters are not as effective as repair of the device.     ?       You may also choose discuss with your provider alternative approaches to treatment.      *[employer] is voluntarily recalling the below devices due to two (2) issues related to the polyester-based polyurethane (PE-PUR) sound abatement foam used in Na Continuous and Non-Continuous Ventilators: 1) PE-PUR foam may degrade into particles which may enter the  device's the air pathway and be ingested or inhaled by the user, and 2) the PE-PUR foam may off-gas certain chemicals. The foam degradation may be exacerbated by use of unapproved cleaning methods, such as ozone (see FDA safety communication on use of Ozone ), and off-gassing may occur during initial operation and may possibly continue throughout the device's useful life.   These issues can result in serious injury which can be life-threatening, cause permanent impairment, and/or require medical intervention to preclude permanent impairment. To date, [employer] has received several complaints regarding the presence of black debris/particles within the airpath circuit (extending from the device outlet, humidifier, tubing, and mask). [employer] also has received reports of headache, upper airway irritation, cough, chest pressure and sinus infection. The potential risks of particulate exposure include: Irritation (skin, eye, and respiratory tract), inflammatory response, headache, asthma, adverse effects to other organs (e.g. kidneys and liver) and toxic carcinogenic affects. The potential risks of chemical exposure due to off-gassing include: headache/dizziness, irritation (eyes, nose, respiratory tract, skin), hypersensitivity, nausea/vomiting, toxic and carcinogenic effects. There have been no reports of death as a result of these issues.    Actions to be taken:  Discontinue the use of your device.  Do not continue to use ozone  with the device.     [employer] affected devices on the recall website, www.[employer].Trapit/SRC-update    i. The website provides current information on the status of the recall and how  to receive permanent corrective action to address the two issues.    ii. The website also provides instructions on how to locate an affected device  Serial Number and will guide users through the registration process.    iii. In the [place], call [phone] Service Hotline if you cannot visit the  website             Your BMI is Body mass index is 36.61 kg/m .  Weight management is a personal decision.  If you are interested in exploring weight loss strategies, the following discussion covers the approaches that may be successful. Body mass index (BMI) is one way to tell whether you are at a healthy weight, overweight, or obese. It measures your weight in relation to your height.  A BMI of 18.5 to 24.9 is in the healthy range. A person with a BMI of 25 to 29.9 is considered overweight, and someone with a BMI of 30 or greater is considered obese. More than two-thirds of American adults are considered overweight or obese.  Being overweight or obese increases the risk for further weight gain. Excess weight may lead to heart disease and diabetes.  Creating and following plans for healthy eating and physical activity may help you improve your health.  Weight control is part of healthy lifestyle and includes exercise, emotional health, and healthy eating habits. Careful eating habits lifelong are the mainstay of weight control. Though there are significant health benefits from weight loss, long-term weight loss with diet alone may be very difficult to achieve- studies show long-term success with dietary management in less than 10% of people. Attaining a healthy weight may be especially difficult to achieve in those with severe obesity. In some cases, medications, devices and surgical management might be considered.  What can you do?  If you are overweight or obese and are interested in methods for weight loss, you should discuss this with your provider.     Consider reducing daily calorie intake by 500 calories.     Keep a food journal.     Avoiding skipping meals, consider cutting portions instead.    Diet combined with exercise helps maintain muscle while optimizing fat loss. Strength training is particularly important for building and maintaining muscle mass. Exercise helps reduce stress, increase energy, and  improves fitness. Increasing exercise without diet control, however, may not burn enough calories to loose weight.       Start walking three days a week 10-20 minutes at a time    Work towards walking thirty minutes five days a week     Eventually, increase the speed of your walking for 1-2 minutes at time    In addition, we recommend that you review healthy lifestyles and methods for weight loss available through the National Institutes of Health patient information sites:  http://win.niddk.nih.gov/publications/index.htm    And look into health and wellness programs that may be available through your health insurance provider, employer, local community center, or estephanie club.    Weight management plan: Patient was referred to their PCP to discuss a diet and exercise plan.

## 2021-07-21 NOTE — PROGRESS NOTES
Oksana is a 75 year old who is being evaluated via a billable video visit.      How would you like to obtain your AVS? MyChart  If the video visit is dropped, the invitation should be resent by: Text to cell phone: [phone]  Will anyone else be joining your video visit? No    Erica Laurent CMA  .Does patient have any form of state insurance? no    Do you have wifi? yes  Do you have a smart phone? yes  Can you download an aquiles on your phone comfortably with out assistance? yes  Can you watch a Youtube video? yes    Video Start Time: 3:00 PM  Video-Visit Details    Type of service:  Video Visit    Video End Time:3:15 PM    Originating Location (pt. Location): Home    Distant Location (provider location):  [facility]     Platform used for Video Visit: [employer]  Obstructive Sleep Apnea - PAP Follow-Up Visit:    Chief Complaint   Patient presents with     CPAP Follow Up     Machine recalled       Oksana Jerez comes in today for follow-up of their severe sleep apnea, managed with CPAP.     PSG on 4/4/2018 showed an AHI of 34.8 per hour.     Overall, she rates the experience with PAP as 10 (0 poor, 10 great). The mask is comfortable. The mask is not leaking.  She is not snoring with the mask on. She is not having gasp arousals.  She is not having significant oral/nasal dryness. The pressure settings are comfortable.     She uses full-face mask.     She does feel rested in the morning.    Total score - [place]: 4 (7/20/2021  3:15 PM)    Respironics    Auto-PAP 5-15 cmH2O download:  29/30 total days of use. 1 nonuse days.  Average use 6 hours 31 minutes per day.  93% days with >4 hours use.  Large leak 18 mins per day average. CPAP 90% pressure 11.8cm. AHI 6.5 (CA-1.2, OA- 2.2, HI- 3)        Reviewed by team: Tobacco  Allergies  Meds            Reviewed by provider:                Problem List:  Patient Active Problem List    Diagnosis Date Noted     Obesity; BMI 35-40 (H) 06/01/2018      "Priority: Medium     TENA (obstructive sleep apnea) 04/08/2018     Priority: Medium     Urinary incontinence, unspecified type 05/29/2017     Priority: Medium     Witnessed apneic spells 06/21/2016     Priority: Medium     Gout 04/15/2015     Priority: Medium     Advanced directives, counseling/discussion 10/22/2014     Priority: Medium     Advance Care Planning:   ACP Review and Resources Provided:  Reviewed chart for advance care plan.  Oksana Jerez has no plan or code status on file. Letter sent..   Added by Shanda Vines on 10/22/2014             Mild intermittent asthma 05/14/2014     Priority: Medium     Benign hypertension with CKD (chronic kidney disease) stage III 11/19/2012     Priority: Medium     CKD (chronic kidney disease) stage 3, GFR 30-59 ml/min 11/15/2010     Priority: Medium     Hyperlipidemia 10/31/2010     Priority: Medium     Esophageal reflux 07/25/2006     Priority: Medium     BENIGN HYPERTENSION; goal <140/90 02/11/2003     Priority: Medium     Allergic rhinitis 02/11/2003     Priority: Medium     Problem list name updated by automated process. Provider to review            Ht 1.651 m (5' 5\")   Wt 99.8 kg (220 lb)   BMI 36.61 kg/m      Impression/Plan:     Severe sleep apnea.     -  Tolerating PAP well. Daytime symptoms are stable..     - Review of CPAP download data shows regular compliance and a residual AHI of 6.5. Considering residual AHI, we decided to increase auto PAP pressure range to 8-15 cm H2O.     - Patient's device is part of  recall. We discussed details of the recall and risks. Patient has a chronic cough and she plans to withhold PAP therapy for about 2 weeks to see if cough improves. We discussed role of a bacterial filter for recalled devices. We reviewed oral appliance therapy as an alternative.     - Patient wants to think about what she will do if a repair to her CPAP device is not immediately available. She will update me.      I spent a total of " 25 minutes for this appointment today which include time spent before, during and after the visit for patient care, counseling and coordination of care.      Ian Shepard MD    CC:  Mahin Howard,

## 2021-07-22 ENCOUNTER — VIRTUAL VISIT (OUTPATIENT)
Dept: SLEEP MEDICINE | Facility: CLINIC | Age: 76
End: 2021-07-22
Payer: MEDICARE

## 2021-07-22 DIAGNOSIS — G47.33 OSA (OBSTRUCTIVE SLEEP APNEA): Primary | ICD-10-CM

## 2021-07-22 PROCEDURE — 99213 OFFICE O/P EST LOW 20 MIN: CPT | Mod: 95 | Performed by: INTERNAL MEDICINE

## 2021-08-15 ENCOUNTER — E-VISIT (OUTPATIENT)
Dept: FAMILY MEDICINE | Facility: CLINIC | Age: 76
End: 2021-08-15
Payer: MEDICARE

## 2021-08-15 DIAGNOSIS — Z20.822 SUSPECTED COVID-19 VIRUS INFECTION: ICD-10-CM

## 2021-08-15 DIAGNOSIS — R05.9 COUGH: Primary | ICD-10-CM

## 2021-08-15 PROCEDURE — 99421 OL DIG E/M SVC 5-10 MIN: CPT | Performed by: INTERNAL MEDICINE

## 2021-08-17 DIAGNOSIS — R05.9 COUGH: ICD-10-CM

## 2021-08-17 DIAGNOSIS — Z20.822 SUSPECTED COVID-19 VIRUS INFECTION: ICD-10-CM

## 2021-08-17 PROCEDURE — U0005 INFEC AGEN DETEC AMPLI PROBE: HCPCS

## 2021-08-17 PROCEDURE — U0003 INFECTIOUS AGENT DETECTION BY NUCLEIC ACID (DNA OR RNA); SEVERE ACUTE RESPIRATORY SYNDROME CORONAVIRUS 2 (SARS-COV-2) (CORONAVIRUS DISEASE [COVID-19]), AMPLIFIED PROBE TECHNIQUE, MAKING USE OF HIGH THROUGHPUT TECHNOLOGIES AS DESCRIBED BY CMS-2020-01-R: HCPCS

## 2021-08-17 NOTE — PATIENT INSTRUCTIONS
Dear Oksana Jerez,    Your symptoms show that you may have coronavirus (COVID-19). This illness can cause fever, cough and trouble breathing. Many people get a mild case and get better on their own. Some people can get very sick.    Will I be tested for COVID-19?  We would like to test you for Covid-19 virus. I have placed orders for this test.     To schedule: go to your [employer] home page and scroll down to the section that says  You have an appointment that needs to be scheduled  and click the large green button that says  Schedule Now  and follow the steps to find the next available openings.    If you are unable to complete these [employer] scheduling steps, please call [phone] to schedule your testing.     Return to work/school/ guidance:  Please let your workplace manager and staffing office know when your quarantine ends     We can t give you an exact date as it depends on the above. You can calculate this on your own or work with your manager/staffing office to calculate this. (For example if you were exposed on 10/4, you would have to quarantine for 14 full days. That would be through 10/18. You could return on 10/19.)      If you receive a positive COVID-19 test result, follow the guidance of the those who are giving you the results. Usually the return to work is 10 (or in some cases 20 days from symptom onset.) If you work at [facility], you must also be cleared by Employee Occupational Health and Safety to return to work.        If you receive a negative COVID-19 test result and did not have a high risk exposure to someone with a known positive COVID-19 test, you can return to work once you're free of fever for 24 hours without fever-reducing medication and your symptoms are improving or resolved.      If you receive a negative COVID-19 test and If you had a high risk exposure to someone who has tested positive for COVID-19 then you can return to work 14 days after your last contact  with the positive individual    Note: If you have ongoing exposure to the covid positive person, this quarantine period may be more than 14 days. (For example, if you are continued to be exposed to your child who tested positive and cannot isolate from them, then the quarantine of 7-14 days can't start until your child is no longer contagious. This is typically 10 days from onset of the child's symptoms. So the total duration may be 17-24 days in this case.)    Sign up for [employer].   We know it's scary to hear that you might have COVID-19. We want to track your symptoms to make sure you're okay over the next 2 weeks. Please look for an email from [employer]--this is a free, online program that we'll use to keep in touch. To sign up, follow the link in the email you will receive. Learn more at [email or web address]    How can I take care of myself?    Get lots of rest. Drink extra fluids (unless a doctor has told you not to)    Take Tylenol (acetaminophen) or ibuprofen for fever or pain. If you have liver or kidney problems, ask your family doctor if it's okay to take Tylenol o ibuprofen    If you have other health problems (like cancer, heart failure, an organ transplant or severe kidney disease): Call your specialty clinic if you don't feel better in the next 2 days.    Know when to call 911. Emergency warning signs include:  o Trouble breathing or shortness of breath  o Pain or pressure in the chest that doesn't go away  o Feeling confused like you haven't felt before, or not being able to wake up  o Bluish-colored lips or face    Where can I get more information?  [employer] [place] - About COVID-19:   [email or web address]    CDC - What to Do If You're Sick:   www.cdc.gov/coronavirus/2019-ncov/about/steps-when-sick.html

## 2021-08-19 LAB — SARS-COV-2 RNA RESP QL NAA+PROBE: NEGATIVE

## 2021-08-21 ENCOUNTER — VIRTUAL VISIT (OUTPATIENT)
Dept: URGENT CARE | Facility: CLINIC | Age: 76
End: 2021-08-21
Payer: MEDICARE

## 2021-08-21 ENCOUNTER — NURSE TRIAGE (OUTPATIENT)
Dept: NURSING | Facility: CLINIC | Age: 76
End: 2021-08-21

## 2021-08-21 DIAGNOSIS — J45.41 MODERATE PERSISTENT ASTHMA WITH EXACERBATION: ICD-10-CM

## 2021-08-21 DIAGNOSIS — J01.90 ACUTE NON-RECURRENT SINUSITIS, UNSPECIFIED LOCATION: ICD-10-CM

## 2021-08-21 DIAGNOSIS — J20.9 ACUTE BRONCHITIS, UNSPECIFIED ORGANISM: Primary | ICD-10-CM

## 2021-08-21 PROCEDURE — 99442 PR PHYSICIAN TELEPHONE EVALUATION 11-20 MIN: CPT | Mod: 95 | Performed by: FAMILY MEDICINE

## 2021-08-21 RX ORDER — PREDNISONE 20 MG/1
40 TABLET ORAL DAILY
Qty: 10 TABLET | Refills: 0 | Status: SHIPPED | OUTPATIENT
Start: 2021-08-21 | End: 2021-08-26

## 2021-08-21 RX ORDER — DOXYCYCLINE HYCLATE 100 MG
100 TABLET ORAL 2 TIMES DAILY
Qty: 14 TABLET | Refills: 0 | Status: SHIPPED | OUTPATIENT
Start: 2021-08-21 | End: 2021-08-28

## 2021-08-21 NOTE — TELEPHONE ENCOUNTER
Data:   Pt reports developing a productive cough on Sunday. Sputum has been clear white. Pt also endorses fever of 101.5 last night and 101.0 today. Denies shortness of breath or chest pain.       Action:   Per protocol advised being seen within 4 hours. Advised virtual urgent care. Care advise given. Called transferred to .       Response:   Pt verbalizes understanding and agrees with plan of care.    Abner Farah RN 8/21/2021 12:21 PM  [facility] Nurse Advisor     Reason for Disposition    [1] Fever > 101 F (38.3 C) AND [2] age > 60    Additional Information    Negative: Severe difficulty breathing (e.g., struggling for each breath, speaks in single words)    Negative: Bluish (or gray) lips or face now    Negative: [1] Difficulty breathing AND [2] exposure to flames, smoke, or fumes    Negative: [1] Stridor AND [2] difficulty breathing    Negative: Sounds like a life-threatening emergency to the triager    Negative: [1] Previous asthma attacks AND [2] this feels like asthma attack    Negative: Dry (non-productive) cough (i.e., no sputum or minimal clear sputum)    Negative: Chest pain  (Exception: MILD central chest pain, present only when coughing)    Negative: Difficulty breathing    Negative: Patient sounds very sick or weak to the triager    Negative: [1] Coughed up blood AND [2] > 1 tablespoon (15 ml) (Exception: blood-tinged sputum)    Negative: Fever > 103 F (39.4 C)    Protocols used: COUGH - ACUTE [id number]    COVID 19 Nurse Triage Plan/Patient Instructions    Please be aware that novel coronavirus (COVID-19) may be circulating in the community. If you develop symptoms such as fever, cough, or SOB or if you have concerns about the presence of another infection including coronavirus (COVID-19), please contact your health care provider or visit [email or web address].     Disposition/Instructions    Virtual Visit with provider recommended. Reference Visit Selection  Guide.    Thank you for taking steps to prevent the spread of this virus.  o Limit your contact with others.  o Wear a simple mask to cover your cough.  o Wash your hands well and often.    Resources    [facility]: About COVID-19: [email or web address]    CDC: What to Do If You're Sick: www.cdc.gov/coronavirus/2019-ncov/about/steps-when-sick.html    CDC: Ending Home Isolation: www.cdc.gov/coronavirus/2019-ncov/hcp/disposition-in-home-patients.html     CDC: Caring for Someone: www.cdc.gov/coronavirus/2019-ncov/if-you-are-sick/care-for-someone.html     [facility]: Interim Guidance for Hospital Discharge to Home: www.[email or web address].[place]/diseases/coronavirus/hcp/hospdischarge.pdf    [facility] clinical trials (COVID-19 research studies): [email or web address].[facility]-clinical-trials     Below are the COVID-19 hotlines at the [place] Department of Health ([facility]). Interpreters are available.   o For health questions: Call 755-243-0096 or 1-203.755.1663 (7 a.m. to 7 p.m.)  o For questions about schools and childcare: Call 159-836-8487 or 1-175.171.5864 (7 a.m. to 7 p.m.)

## 2021-08-21 NOTE — PROGRESS NOTES
Oksana is a 75 year old who is being evaluated via a billable telephone visit.      Assessment & Plan     Acute bronchitis vs sinus infection w/ exacerbation of moderate persistent asthma: 1wk of URI symptoms, now in the last day is spiking new fever. She had covid test 4d ago making that less likely. She is unable to go in to be seen today and I think given new onset of fever almost a week into URI that is not covid is highly concerning for bacterial infection in either sinuses or potential pneumonia. She is breathing comfortably on the phone, able to speak full sentences and is alert and overall sounds well - and she feels she is doing ok. Without red flags, I think treating with an antibiotic that would cover the entire upper respiratory tract is appropriate - treating with doxycycline. She is also having a mild asthma exacerbation - still responding to albuterol but using more frequently. Will add prednisone. Discussed concerning symptoms for which to go to urgent care or the ED.   - doxycycline hyclate (VIBRA-TABS) 100 MG tablet; Take 1 tablet (100 mg) by mouth 2 times daily for 7 days  - predniSONE (DELTASONE) 20 MG tablet; Take 2 tablets (40 mg) by mouth daily for 5 days    Shayy Walker MD  [facility] VIRTUAL URGENT CARE    Subjective   Oksana is a 75 year old who presents for fever and cough    HPI     1wk ago, started to have cough and rhinorrhea. Given the local outbreak of covid, she was tested for covid-19 four days ago which was negative.   Yesterday and today, she has been having fevers (101.5max last night). Responding to otc analgesics, but returns.   Cough is overall dry, but more frequently productive of white/clear sputum. Has a lot of congestion as well, mild facial pressure. Cough does not make her breathless. Does use her inhaler (albuterol) when cough feels tight - does help some, but using more frequently than normal. No chest pain or lower extremity swelling.   No sick  contacts.       Review of Systems   As above      Objective           Vitals:  No vitals were obtained today due to virtual visit.    Physical Exam   healthy, alert sounding by phone - no distress  PSYCH: Alert and oriented times 3; coherent speech, normal   rate and volume, able to articulate logical thoughts, able   to abstract reason, no tangential thoughts, no hallucinations   or delusions  Her affect is normal  RESP: No cough, no audible wheezing, able to talk in full sentences  Remainder of exam unable to be completed due to telephone visits          Phone call duration: 14 minutes

## 2021-08-24 NOTE — MR AVS SNAPSHOT
After Visit Summary   4/4/2018    Oksana Jerez    MRN: [id number]           Patient Information     Date Of Birth          1945        Visit Information        Provider Department      4/4/2018 8:30 PM BED 4  SLEEP [facility]        Today's Diagnoses     Chronic fatigue        Snoring        Observed sleep apnea          Care Instructions    Diagnostic PSG completed per provider order.  Patient met criteria for PAP therapy, but too late to start CPAP.          Follow-ups after your visit        Your next 10 appointments already scheduled     Apr 19, 2018 11:30 AM CDT   Return Sleep Patient with Ian Shepard MD   [facility] ([facility])    [address] [address] [phone]              Who to contact     If you have questions or need follow up information about today's clinic visit or your schedule please contact [facility] directly at [phone].  Normal or non-critical lab and imaging results will be communicated to you by [employer], letter or phone within 4 business days after the clinic has received the results. If you do not hear from us within 7 days, please contact the clinic through [employer] or phone. If you have a critical or abnormal lab result, we will notify you by phone as soon as possible.  Submit refill requests through [employer] or call your pharmacy and they will forward the refill request to us. Please allow 3 business days for your refill to be completed.          Additional Information About Your Visit        [employer] Information     [employer] gives you secure access to your electronic health record. If you see a primary care provider, you can also send messages to your care team and make appointments. If you have questions, please call your primary care clinic.  If you do not have a primary care provider, please call [phone] and they will assist you.        Care  "Subjective     Brenna Abraham is a 38 y.o. female who presents with Body Aches (bilateral ear pain, sore throat, painful swallowing, headache, x3 days )            Patient is a 38-year-old female who presents to urgent care with bilateral ear pain, sore throat, painful swallowing, headache and body aches for the last 3 days.  Patient reports that she started on amoxicillin [place] but she reports she has been taking for 2 days with minimal improvement of symptoms.  She denies any ill contacts in particular to COVID-19.  She is fully vaccinated to Covid.  Denies any cough, diarrhea or change to taste and smell.      Pharyngitis   This is a new problem. The current episode started in the past 7 days. The problem has been gradually worsening. There has been no fever. Associated symptoms include ear pain and headaches. Pertinent negatives include no coughing, diarrhea or vomiting. Treatments tried: As above.        Review of Systems   Constitutional: Positive for chills and malaise/fatigue.   HENT: Positive for ear pain and sore throat.    Eyes: Negative for discharge and redness.   Respiratory: Negative for cough and wheezing.    Gastrointestinal: Negative for diarrhea and vomiting.   Musculoskeletal: Positive for myalgias.   Neurological: Positive for headaches.   All other systems reviewed and are negative.             Objective     /70 (BP Location: Right arm, Patient Position: Sitting, BP Cuff Size: Adult)   Pulse 96   Temp 36.9 °C (98.4 °F) (Temporal)   Resp 16   Ht 1.626 m (5' 4\")   Wt 82.1 kg (181 lb)   SpO2 96%   BMI 31.07 kg/m²    PMH:  has a past medical history of Cancer (HCC) (1/2016).  MEDS: Reviewed .   ALLERGIES:   Allergies   Allergen Reactions   • Latex Rash and Itching     Rash and itching at contact site   • Tape      ALLTape and bandaids Cause blisters, NO PAPER TAPE     SURGHX:   Past Surgical History:   Procedure Laterality Date   • TISSUE EXPANDER " EveryWhere ID     This is your Care EveryWhere ID. This could be used by other organizations to access your [place] medical records  [id number]         Blood Pressure from Last 3 Encounters:   02/21/18 116/79   05/10/17 131/82   07/18/16 132/80    Weight from Last 3 Encounters:   02/21/18 100.2 kg (221 lb)   05/10/17 99.1 kg (218 lb 6.4 oz)   07/18/16 96.2 kg (212 lb)              We Performed the Following     Comprehensive Sleep Study        Primary Care Provider Office Phone # Fax #    Erica Olvera,  [phone] [address] [facility] [address]  [facility] [address]        Equal Access to Services     SOCORRO ROGERS : Hadii aad ku hadasho Soraziaali, waaxda luqadaha, qaybta kaalmada adeegyada, waxay joonin haykya cardona . So [facility] [place] [facility] [phone].    ATENCIÓN: Si habla español, tiene a barahona disposición servicios gratuitos de asistencia lingüística. [facility] [phone].    We comply with applicable federal civil rights laws and [place] laws. We do not discriminate on the basis of race, color, national origin, age, disability, sex, sexual orientation, or gender identity.            Thank you!     Thank you for choosing [facility]  for your care. Our goal is always to provide you with excellent care. Hearing back from our patients is one way we can continue to improve our services. Please take a few minutes to complete the written survey that you may receive in the mail after your visit with us. Thank you!             Your Updated Medication List - Protect others around you: Learn how to safely use, store and throw away your medicines at www.disposemymeds.org.          This list is accurate as of 4/4/18 11:59 PM.  Always use your most recent med list.                   Brand Name Dispense Instructions for use Diagnosis    albuterol 108 (90 BASE) MCG/ACT Inhaler    VENTOLIN HFA    3 Inhaler    Inhale 2 puffs into the lungs every 6 hours as needed for shortness of breath /  PLACE/REMOVE Right 5/3/2018    Procedure: TISSUE EXPANDER PLACE/REMOVE - REMOVAL;  Surgeon: Jose L Huff M.D.;  Location: SURGERY SAME DAY [facility];  Service: Plastics   • PELVISCOPY Bilateral 4/12/2016    Procedure: PELVISCOPY, BSO, LYSIS OF ADHESIONS;  Surgeon: Ranjit Gr M.D.;  Location: SURGERY SAME DAY [facility];  Service:    • IRRIGATION & DEBRIDEMENT GENERAL Left 3/3/2016    Procedure: IRRIGATION & DEBRIDEMENT GENERAL OF MASTECTOMY SKIN FLAP;  Surgeon: Jose L Huff M.D.;  Location: SURGERY SAME DAY [facility];  Service:    • TISSUE EXPANDER PLACE/REMOVE Left 3/3/2016    Procedure: TISSUE EXPANDER PLACE/REMOVE FOR REMOVAL AND CLOSURE WITH LOCAL FLAPS;  Surgeon: Jose L Huff M.D.;  Location: SURGERY SAME DAY [facility];  Service:    • MASTECTOMY Bilateral 1/19/2016    Procedure: MASTECTOMY;  Surgeon: Tamika Cee M.D.;  Location: SURGERY SAME DAY [facility];  Service:    • NODE BIOPSY SENTINEL Right 1/19/2016    Procedure: NODE BIOPSY SENTINEL;  Surgeon: Tamika Cee M.D.;  Location: SURGERY SAME DAY [facility];  Service:    • AXILLARY NODE DISSECTION Right 1/19/2016    Procedure: AXILLARY NODE DISSECTION possible conversion;  Surgeon: Tamika Cee M.D.;  Location: SURGERY SAME DAY [facility];  Service:    • CATH REMOVAL Right 1/19/2016    Procedure: CATH REMOVAL port;  Surgeon: Tamika Cee M.D.;  Location: SURGERY SAME DAY [facility];  Service:    • TISSUE EXPANDER PLACE/REMOVE Bilateral 1/19/2016    Procedure: TISSUE EXPANDER PLACE/REMOVE using acellular dermal matrix;  Surgeon: Jose L Huff M.D.;  Location: SURGERY SAME DAY [facility];  Service:    • PRIMARY C SECTION      FOUR     SOCHX:  reports that she quit smoking about 6 years ago. Her smoking use included cigarettes. She has a 0.03 pack-year smoking history. She has never used smokeless tobacco. She reports current alcohol use. She reports that she does not use  drugs.  FH: Family history was reviewed, no pertinent findings to report    Physical Exam  Vitals reviewed.   Constitutional:       General: She is not in acute distress.     Appearance: She is well-developed.   HENT:      Head: Normocephalic and atraumatic.      Right Ear: External ear normal.      Left Ear: External ear normal.      Mouth/Throat:      Pharynx: Posterior oropharyngeal erythema present. No oropharyngeal exudate.   Eyes:      Conjunctiva/sclera: Conjunctivae normal.      Pupils: Pupils are equal, round, and reactive to light.   Neck:      Trachea: No tracheal deviation.   Cardiovascular:      Rate and Rhythm: Normal rate and regular rhythm.      Heart sounds: No murmur heard.     Pulmonary:      Effort: Pulmonary effort is normal. No respiratory distress.      Breath sounds: Normal breath sounds.   Musculoskeletal:         General: Normal range of motion.      Cervical back: Normal range of motion and neck supple.   Lymphadenopathy:      Cervical: Cervical adenopathy present.   Skin:     General: Skin is warm.      Findings: No rash.   Neurological:      Mental Status: She is alert and oriented to person, place, and time.      Coordination: Coordination normal.   Psychiatric:         Behavior: Behavior normal.         Thought Content: Thought content normal.         Judgment: Judgment normal.                             Assessment & Plan        1. Pharyngitis, unspecified etiology  - POCT Rapid Strep A  - CULTURE THROAT; Future  - COVID/SARS CoV-2 PCR; Future  - lidocaine (XYLOCAINE) 2 % Solution; Take 15 mL by mouth as needed for Throat/Mouth Pain. Gargle and spit every 4 hours as needed for pain.  Dispense: 600 mL; Refill: 0    2. Nonintractable headache, unspecified chronicity pattern, unspecified headache type  - POCT Rapid Strep A  - CULTURE THROAT; Future  - COVID/SARS CoV-2 PCR; Future  - lidocaine (XYLOCAINE) 2 % Solution; Take 15 mL by mouth as needed for Throat/Mouth Pain. Gargle and spit  dyspnea or wheezing    Mild intermittent asthma without complication       allopurinol 100 MG tablet    ZYLOPRIM    90 tablet    TAKE 1 TABLET EVERY DAY    Gout of ankle, unspecified cause, unspecified chronicity, unspecified laterality, Benign hypertension with CKD (chronic kidney disease) stage III       ascorbic acid 500 MG tablet    VITAMIN C     1 TABLET TWICE DAILY        aspirin 81 MG tablet     0    ONE DAILY or as needed    Essential hypertension, benign       CALTRATE 600 + D 600-200 MG-IU Tabs     0    1 TABLET DAILY    Routine general medical examination at a health care facility       esomeprazole 20 MG CR capsule    nexIUM    90 capsule    Take 1 capsule (20 mg) by mouth every morning (before breakfast)    Gastroesophageal reflux disease without esophagitis       fluticasone 110 MCG/ACT Inhaler    FLOVENT HFA    3 Inhaler    Inhale 1 puff into the lungs 2 times daily    Mild intermittent asthma without complication       losartan 25 MG tablet    COZAAR    90 tablet    TAKE 1 TABLET EVERY DAY    Gout of ankle, unspecified cause, unspecified chronicity, unspecified laterality, Benign hypertension with CKD (chronic kidney disease) stage III       mometasone 50 MCG/ACT spray    NASONEX     Spray 2 sprays into both nostrils daily        Multi-vitamin Tabs tablet   Generic drug:  multivitamin, therapeutic with minerals      QD        ranitidine 150 MG capsule    ZANTAC     Take 1 capsule by mouth 2 times daily.    Esophageal reflux       simvastatin 40 MG tablet    ZOCOR    90 tablet    Take 1 tablet (40 mg) by mouth At Bedtime    Hyperlipidemia LDL goal <100       zaleplon 5 MG capsule    SONATA    1 capsule    Take 1 capsule (5 mg) by mouth At Bedtime For night of sleep study        ZYRTEC 10 MG tablet   Generic drug:  cetirizine     0    ONE TABLET DAILY    Allergic rhinitis, cause unspecified          every 4 hours as needed for pain.  Dispense: 600 mL; Refill: 0           Patient's strep in clinic is negative however patient has been on amoxicillin for 2 days.   Will write for the above to help with symptomatic relief.  No evidence of peritonsillar retropharyngeal abscess formation.    Appropriate PPE worn at all times by provider.   Pt. Had face mask on throughout entirety of the visit other than oropharyngeal examination today.     Testing performed for COVID-19.    Patient currently without indication of need for higher level of care.   protocols.  Results will also be released to patient/guardian in MyChart or called to the patient/guardian directly.  Encouraged mask use, frequent handwashing, wiping down hard surfaces, etc.  OF note greater than the 30 min. Discussing the above, previous tx, need for further testing, review of pts chart and labs.   Patient and/or guardian given precautionary s/sx that mandate immediate follow up and evaluation in the ED. Advised of risks of not doing so.  Side effects of OTC or prescribed medications discussed.   DDX, Supportive care, and indications for immediate follow-up discussed with patient.    Instructed to return to clinic or nearest emergency department if we are not available for any change in condition, further concerns, or worsening of symptoms.    The patient and/or guardian demonstrated a good understanding and agreed with the treatment plan.    Please note that this dictation was created using voice recognition software. I have made every reasonable attempt to correct obvious errors, but I expect that there are errors of grammar and possibly content that I did not discover before finalizing the note.

## 2021-09-04 ENCOUNTER — HEALTH MAINTENANCE LETTER (OUTPATIENT)
Age: 76
End: 2021-09-04

## 2021-09-18 DIAGNOSIS — I12.9 BENIGN HYPERTENSION WITH CKD (CHRONIC KIDNEY DISEASE) STAGE III (H): ICD-10-CM

## 2021-09-18 DIAGNOSIS — N18.30 BENIGN HYPERTENSION WITH CKD (CHRONIC KIDNEY DISEASE) STAGE III (H): ICD-10-CM

## 2021-09-20 NOTE — TELEPHONE ENCOUNTER
Routing refill request to provider for review/approval because:  Drug interaction warning  Labs out of range:    Lab Test 09/28/20  1129   CR 1.12*     Last office vist: 6/15/2021    Pended 90 day supply & 2 refills. Please Review.    Next office visit: 10/14/2021    Amber Rushing RN  [facility]

## 2021-09-21 RX ORDER — LOSARTAN POTASSIUM 25 MG/1
TABLET ORAL
Qty: 90 TABLET | Refills: 2 | Status: SHIPPED | OUTPATIENT
Start: 2021-09-21 | End: 2022-03-16

## 2021-10-14 ENCOUNTER — OFFICE VISIT (OUTPATIENT)
Dept: FAMILY MEDICINE | Facility: CLINIC | Age: 76
End: 2021-10-14
Payer: MEDICARE

## 2021-10-14 VITALS
DIASTOLIC BLOOD PRESSURE: 76 MMHG | HEART RATE: 104 BPM | OXYGEN SATURATION: 94 % | TEMPERATURE: 98.1 F | WEIGHT: 208.9 LBS | SYSTOLIC BLOOD PRESSURE: 109 MMHG | HEIGHT: 65 IN | RESPIRATION RATE: 16 BRPM | BODY MASS INDEX: 34.81 KG/M2

## 2021-10-14 DIAGNOSIS — J45.30 MILD PERSISTENT ASTHMA WITHOUT COMPLICATION: Primary | ICD-10-CM

## 2021-10-14 DIAGNOSIS — Z23 NEED FOR PROPHYLACTIC VACCINATION AND INOCULATION AGAINST INFLUENZA: ICD-10-CM

## 2021-10-14 DIAGNOSIS — Z00.00 ENCOUNTER FOR PREVENTATIVE ADULT HEALTH CARE EXAMINATION: ICD-10-CM

## 2021-10-14 LAB
BASOPHILS # BLD AUTO: 0.1 10E3/UL (ref 0–0.2)
BASOPHILS NFR BLD AUTO: 1 %
EOSINOPHIL # BLD AUTO: 0.3 10E3/UL (ref 0–0.7)
EOSINOPHIL NFR BLD AUTO: 4 %
ERYTHROCYTE [DISTWIDTH] IN BLOOD BY AUTOMATED COUNT: 13.4 % (ref 10–15)
HCT VFR BLD AUTO: 41.3 % (ref 35–47)
HGB BLD-MCNC: 13.8 G/DL (ref 11.7–15.7)
LYMPHOCYTES # BLD AUTO: 1.2 10E3/UL (ref 0.8–5.3)
LYMPHOCYTES NFR BLD AUTO: 16 %
MCH RBC QN AUTO: 31.3 PG (ref 26.5–33)
MCHC RBC AUTO-ENTMCNC: 33.4 G/DL (ref 31.5–36.5)
MCV RBC AUTO: 94 FL (ref 78–100)
MONOCYTES # BLD AUTO: 1 10E3/UL (ref 0–1.3)
MONOCYTES NFR BLD AUTO: 14 %
NEUTROPHILS # BLD AUTO: 4.9 10E3/UL (ref 1.6–8.3)
NEUTROPHILS NFR BLD AUTO: 66 %
PLATELET # BLD AUTO: 301 10E3/UL (ref 150–450)
RBC # BLD AUTO: 4.41 10E6/UL (ref 3.8–5.2)
WBC # BLD AUTO: 7.5 10E3/UL (ref 4–11)

## 2021-10-14 PROCEDURE — 84443 ASSAY THYROID STIM HORMONE: CPT | Performed by: INTERNAL MEDICINE

## 2021-10-14 PROCEDURE — G0008 ADMIN INFLUENZA VIRUS VAC: HCPCS | Performed by: INTERNAL MEDICINE

## 2021-10-14 PROCEDURE — 36415 COLL VENOUS BLD VENIPUNCTURE: CPT | Performed by: INTERNAL MEDICINE

## 2021-10-14 PROCEDURE — 80053 COMPREHEN METABOLIC PANEL: CPT | Performed by: INTERNAL MEDICINE

## 2021-10-14 PROCEDURE — 99213 OFFICE O/P EST LOW 20 MIN: CPT | Mod: 25 | Performed by: INTERNAL MEDICINE

## 2021-10-14 PROCEDURE — 90662 IIV NO PRSV INCREASED AG IM: CPT | Performed by: INTERNAL MEDICINE

## 2021-10-14 PROCEDURE — 80061 LIPID PANEL: CPT | Performed by: INTERNAL MEDICINE

## 2021-10-14 PROCEDURE — 85025 COMPLETE CBC W/AUTO DIFF WBC: CPT | Performed by: INTERNAL MEDICINE

## 2021-10-14 PROCEDURE — G0439 PPPS, SUBSEQ VISIT: HCPCS | Performed by: INTERNAL MEDICINE

## 2021-10-14 ASSESSMENT — ENCOUNTER SYMPTOMS
DIZZINESS: 0
ARTHRALGIAS: 0
BREAST MASS: 0
CHILLS: 0
DIARRHEA: 0
HEADACHES: 0
SORE THROAT: 0
JOINT SWELLING: 0
PARESTHESIAS: 0
ABDOMINAL PAIN: 0
MYALGIAS: 0
NERVOUS/ANXIOUS: 0
COUGH: 1
HEARTBURN: 0
PALPITATIONS: 0
DYSURIA: 0
EYE PAIN: 0
HEMATOCHEZIA: 0
FREQUENCY: 1
CONSTIPATION: 1
HEMATURIA: 0
FEVER: 0
NAUSEA: 0
SHORTNESS OF BREATH: 0
WEAKNESS: 0

## 2021-10-14 ASSESSMENT — ACTIVITIES OF DAILY LIVING (ADL): CURRENT_FUNCTION: NO ASSISTANCE NEEDED

## 2021-10-14 ASSESSMENT — MIFFLIN-ST. JEOR: SCORE: 1433.44

## 2021-10-14 NOTE — PROGRESS NOTES
"SUBJECTIVE:   Oksana Jerez is a 76 year old female who presents for Preventive Visit.  Patient with a history of mild intermittent asthma recently she is having more issues of a cough and nocturnal symptoms.  She is on Elavil for Adderall as well as Flovent.  She states that she takes an extra puff of Flovent at night if she feels congested.    She denies any fever no chest pain.  Cough is been fairly persistent over the last 4 to 6 weeks.  Is worse when she lies down.  No swelling of the lower extremities.  She claims a friend has a similar cough.  She denies any fever or perturbation of taste or smell.    No bowel changes.  No urinary symptoms.  No pelvic pain or vaginal discharge.  She is up-to-date with mammography.  She will be scheduled another mammography in the winter.      Patient has been advised of split billing requirements and indicates understanding: Yes   Are you in the first 12 months of your Medicare coverage?  No    Healthy Habits:     In general, how would you rate your overall health?  Good    Frequency of exercise:  1 day/week    Duration of exercise:  Less than 15 minutes    Do you usually eat at least 4 servings of fruit and vegetables a day, include whole grains    & fiber and avoid regularly eating high fat or \"junk\" foods?  No    Taking medications regularly:  Yes    Medication side effects:  None    Ability to successfully perform activities of daily living:  No assistance needed    Home Safety:  No safety concerns identified    Hearing Impairment:  No hearing concerns    In the past 6 months, have you been bothered by leaking of urine? Yes    In general, how would you rate your overall mental or emotional health?  Good      PHQ-2 Total Score: 1    Additional concerns today:  Yes    Do you feel safe in your environment? Yes    Have you ever done Advance Care Planning? (For example, a Health Directive, POLST, or a discussion with a medical provider or your loved ones about your " wishes): Yes, advance care planning is on file.       Fall risk  Fallen 2 or more times in the past year?: No  Any fall with injury in the past year?: No    Cognitive Screening   1) Repeat 3 items (Leader, Season, Table)    2) Clock draw: NORMAL  3) 3 item recall: Recalls 3 objects  Results: 3 items recalled: COGNITIVE IMPAIRMENT LESS LIKELY    Mini-CogTM Copyright NESTOR Reddy. Licensed by the author for use in [facility]; reprinted with permission (harman@[facility]). All rights reserved.      Do you have sleep apnea, excessive snoring or daytime drowsiness?: yes, not currently using    Reviewed and updated as needed this visit by clinical staff                 Reviewed and updated as needed this visit by Provider                Social History     Tobacco Use     Smoking status: Former Smoker     Quit date: 1974     Years since quittin.8     Smokeless tobacco: Never Used   Substance Use Topics     Alcohol use: Yes     Alcohol/week: 0.0 standard drinks     Comment: 1-2 drinks weekly     If you drink alcohol do you typically have >3 drinks per day or >7 drinks per week? No    Alcohol Use 10/14/2021   Prescreen: >3 drinks/day or >7 drinks/week? No   Prescreen: >3 drinks/day or >7 drinks/week? -               Current providers sharing in care for this patient include:   Patient Care Team:  Mahin Howard MD as PCP - General (Internal Medicine)  Mahin Howard MD as Assigned PCP  Ian Shepard MD as Assigned Sleep Provider    The following health maintenance items are reviewed in Epic and correct as of today:  Health Maintenance Due   Topic Date Due     ANNUAL REVIEW OF HM ORDERS  Never done     ASTHMA ACTION PLAN  2019     INFLUENZA VACCINE (1) 2021     MEDICARE ANNUAL WELLNESS VISIT  2021     Lab work is in process      Mammogram Screening - Patient over age 75, has elected to continue with screening.  Pertinent mammograms are reviewed under the imaging tab.    Review of  "Systems  Constitutional, HEENT, cardiovascular, pulmonary, GI, , musculoskeletal, neuro, skin, endocrine and psych systems are negative, except as otherwise noted.    OBJECTIVE:   There were no vitals taken for this visit. Estimated body mass index is 36.61 kg/m  as calculated from the following:    Height as of 7/21/21: 1.651 m (5' 5\").    Weight as of 7/21/21: 99.8 kg (220 lb).  Physical Exam  GENERAL: healthy, alert and no distress  EYES: Eyes grossly normal to inspection, PERRL and conjunctivae and sclerae normal  HENT: ear canals and TM's normal, nose and mouth without ulcers or lesions  NECK: no adenopathy, no asymmetry, masses, or scars and thyroid normal to palpation  RESP: lungs clear to auscultation - no rales, rhonchi or wheezes  BREAST: normal without masses, tenderness or nipple discharge and no palpable axillary masses or adenopathy  CV: regular rate and rhythm, normal S1 S2, no S3 or S4, no murmur, click or rub, no peripheral edema and peripheral pulses strong  ABDOMEN: soft, nontender, no hepatosplenomegaly, no masses and bowel sounds normal  MS: no gross musculoskeletal defects noted, no edema  SKIN: no suspicious lesions or rashes  NEURO: Normal strength and tone, mentation intact and speech normal  PSYCH: mentation appears normal, affect normal/bright    Diagnostic Test Results:  Labs reviewed in Epic    ASSESSMENT / PLAN:       ICD-10-CM    1. Mild persistent asthma without complication  J45.30 fluticasone-vilanterol (BREO ELLIPTA) 100-25 MCG/INH inhaler     XR Chest 2 Views     fluticasone-vilanterol (BREO ELLIPTA) 100-25 MCG/INH inhaler   2. Encounter for preventative adult health care examination  Z00.00 Comprehensive metabolic panel (BMP + Alb, Alk Phos, ALT, AST, Total. Bili, TP)     CBC with platelets and differential     TSH with free T4 reflex     Lipid panel reflex to direct LDL Fasting     Comprehensive metabolic panel (BMP + Alb, Alk Phos, ALT, AST, Total. Bili, TP)     CBC with " "platelets and differential     TSH with free T4 reflex     Lipid panel reflex to direct LDL Fasting   3. Need for prophylactic vaccination and inoculation against influenza  Z23 INFLUENZA, QUAD, HIGH DOSE, PF, 65YR + (FLUZONE HD)     ADMIN INFLUENZA (For MEDICARE Patients ONLY) []   Currently the patient has persistent symptoms at the level of her asthma.  Possible exacerbations include pollen versus recent upper respiratory tract infection.  Nevertheless I would recommend discontinuation of the Flovent.  She will initiate Breo 1 puff twice daily.  She will continue to utilize her albuterol as needed.    Given the change in the frequency of her asthma chest x-ray also will be obtained today.  She will return to clinic in 6 weeks to reassess her breathing    Patient has been advised of split billing requirements and indicates understanding: Yes  COUNSELING:  Reviewed preventive health counseling, as reflected in patient instructions    Estimated body mass index is 36.61 kg/m  as calculated from the following:    Height as of 7/21/21: 1.651 m (5' 5\").    Weight as of 7/21/21: 99.8 kg (220 lb).    Patient working on diet and exercise    She reports that she quit smoking about 47 years ago. She has never used smokeless tobacco.      Appropriate preventive services were discussed with this patient, including applicable screening as appropriate for cardiovascular disease, diabetes, osteopenia/osteoporosis, and glaucoma.  As appropriate for age/gender, discussed screening for colorectal cancer, prostate cancer, breast cancer, and cervical cancer. Checklist reviewing preventive services available has been given to the patient.    Reviewed patients plan of care and provided an AVS. The Basic Care Plan (routine screening as documented in Health Maintenance) for Oksana meets the Care Plan requirement. This Care Plan has been established and reviewed with the Patient.    Counseling Resources:  ATP IV Guidelines  Pooled " Cohorts Equation Calculator  Breast Cancer Risk Calculator  Breast Cancer: Medication to Reduce Risk  FRAX Risk Assessment  ICSI Preventive Guidelines  Dietary Guidelines for Americans, 2010  USDA's MyPlate  ASA Prophylaxis  Lung CA Screening    Maihn Howard MD  [facility]    Identified Health Risks:

## 2021-10-14 NOTE — PATIENT INSTRUCTIONS
Oksana;  Very nice to see you today.  I do think that your asthma is somewhat active currently.  I would like you to hold your Flovent for now.  I have prescribed a medication called Breo which has a steroid agent and a long-acting bronchodilator.  This should help get you through this exacerbation.  I will also obtain a chest x-ray today.    I would like to see you back in 2 months to see how you are doing from a breathing standpoint.    The usual labs otherwise will be obtained today as a part of normal screening.  I should mention that your blood pressure is superb today.    Make certain that you are contacted regarding your mammogram.  It is important that you continue this on a yearly basis independent of age.    Best regards  Mahin

## 2021-10-15 LAB
ALBUMIN SERPL-MCNC: 3.4 G/DL (ref 3.4–5)
ALP SERPL-CCNC: 102 U/L (ref 40–150)
ALT SERPL W P-5'-P-CCNC: 41 U/L (ref 0–50)
ANION GAP SERPL CALCULATED.3IONS-SCNC: 10 MMOL/L (ref 3–14)
AST SERPL W P-5'-P-CCNC: 28 U/L (ref 0–45)
BILIRUB SERPL-MCNC: 0.5 MG/DL (ref 0.2–1.3)
BUN SERPL-MCNC: 16 MG/DL (ref 7–30)
CALCIUM SERPL-MCNC: 9 MG/DL (ref 8.5–10.1)
CHLORIDE BLD-SCNC: 105 MMOL/L (ref 94–109)
CHOLEST SERPL-MCNC: 124 MG/DL
CO2 SERPL-SCNC: 21 MMOL/L (ref 20–32)
CREAT SERPL-MCNC: 1 MG/DL (ref 0.52–1.04)
FASTING STATUS PATIENT QL REPORTED: YES
GFR SERPL CREATININE-BSD FRML MDRD: 54 ML/MIN/1.73M2
GLUCOSE BLD-MCNC: 115 MG/DL (ref 70–99)
HDLC SERPL-MCNC: 50 MG/DL
LDLC SERPL CALC-MCNC: 51 MG/DL
NONHDLC SERPL-MCNC: 74 MG/DL
POTASSIUM BLD-SCNC: 3.9 MMOL/L (ref 3.4–5.3)
PROT SERPL-MCNC: 7.7 G/DL (ref 6.8–8.8)
SODIUM SERPL-SCNC: 136 MMOL/L (ref 133–144)
TRIGL SERPL-MCNC: 115 MG/DL
TSH SERPL DL<=0.005 MIU/L-ACNC: 2.48 MU/L (ref 0.4–4)

## 2021-10-15 ASSESSMENT — ASTHMA QUESTIONNAIRES: ACT_TOTALSCORE: 19

## 2021-10-19 ENCOUNTER — HOSPITAL ENCOUNTER (OUTPATIENT)
Dept: GENERAL RADIOLOGY | Facility: CLINIC | Age: 76
Discharge: HOME | End: 2021-10-19
Attending: INTERNAL MEDICINE | Admitting: INTERNAL MEDICINE
Payer: MEDICARE

## 2021-10-19 DIAGNOSIS — J45.30 MILD PERSISTENT ASTHMA WITHOUT COMPLICATION: ICD-10-CM

## 2021-10-19 PROCEDURE — 71046 X-RAY EXAM CHEST 2 VIEWS: CPT

## 2021-11-02 ENCOUNTER — HOSPITAL ENCOUNTER (OUTPATIENT)
Dept: MAMMOGRAPHY | Facility: CLINIC | Age: 76
Discharge: HOME | End: 2021-11-02
Attending: INTERNAL MEDICINE | Admitting: INTERNAL MEDICINE
Payer: MEDICARE

## 2021-11-02 DIAGNOSIS — Z12.31 VISIT FOR SCREENING MAMMOGRAM: ICD-10-CM

## 2021-11-02 PROCEDURE — 77063 BREAST TOMOSYNTHESIS BI: CPT

## 2021-12-14 ENCOUNTER — OFFICE VISIT (OUTPATIENT)
Dept: FAMILY MEDICINE | Facility: CLINIC | Age: 76
End: 2021-12-14
Payer: MEDICARE

## 2021-12-14 VITALS
TEMPERATURE: 96.8 F | HEIGHT: 65 IN | DIASTOLIC BLOOD PRESSURE: 85 MMHG | BODY MASS INDEX: 35.49 KG/M2 | OXYGEN SATURATION: 95 % | WEIGHT: 213 LBS | RESPIRATION RATE: 16 BRPM | HEART RATE: 77 BPM | SYSTOLIC BLOOD PRESSURE: 144 MMHG

## 2021-12-14 DIAGNOSIS — J45.20 MILD INTERMITTENT ASTHMA WITHOUT COMPLICATION: Primary | Chronic | ICD-10-CM

## 2021-12-14 DIAGNOSIS — J45.41 MODERATE PERSISTENT ASTHMA WITH EXACERBATION: ICD-10-CM

## 2021-12-14 PROCEDURE — 99213 OFFICE O/P EST LOW 20 MIN: CPT | Performed by: INTERNAL MEDICINE

## 2021-12-14 ASSESSMENT — PAIN SCALES - GENERAL: PAINLEVEL: NO PAIN (0)

## 2021-12-14 ASSESSMENT — MIFFLIN-ST. JEOR: SCORE: 1452.04

## 2021-12-14 NOTE — PROGRESS NOTES
"  Assessment & Plan     Mild intermittent asthma without complication  Patient's asthma has intensified in conjunction with a previous upper respiratory tract infection.    Moderate persistent asthma with exacerbation  Current symptomatology consistent with moderate persistent asthma doing well on her current inhaler.  I would like her to refer to Flovent and albuterol.  If she has worsening of her exacerbation I would recommend spirometry, and an allergy referral.               See Patient Instructions    No follow-ups on file.    Mahin Howard MD  [facility] HEIDE Gandhi is a 76 year old who presents for the following health issues     HPI 76-year-old female with recent intensification of her asthma in conjunction with a viral upper respiratory tract infection.  Uncertain etiology of her previous infectious process.  Nevertheless she continues to take her inhaler.  She is feeling much better without any wheezing or symptoms.    The patient and I discussed that she very likely can revert back to her Flovent and albuterol.  She has worsening of her symptoms we will intensify her regime as before.    Patient and I discussed that if this becomes more of a constant intensity for her asthma that further evaluation needs to take place along be lines of pulmonary, and allergic evaluation.          Review of Systems   Constitutional, HEENT, cardiovascular, pulmonary, gi and gu systems are negative, except as otherwise noted.      Objective    BP (!) 144/85 (BP Location: Right arm, Patient Position: Chair, Cuff Size: Adult Large)   Pulse 77   Temp 96.8  F (36  C) (Temporal)   Resp 16   Ht 1.651 m (5' 5\")   Wt 96.6 kg (213 lb)   SpO2 95%   BMI 35.45 kg/m    Body mass index is 35.45 kg/m .  Physical Exam   Lungs are clear without wheezes rales or rhonchi  Extremities no edema  Heart regular rate and rhythm  Affect and mood normal    Office Visit on 10/14/2021   Component Date Value Ref " Range Status     Sodium 10/14/2021 136  133 - 144 mmol/L Final     Potassium 10/14/2021 3.9  3.4 - 5.3 mmol/L Final     Chloride 10/14/2021 105  94 - 109 mmol/L Final     Carbon Dioxide (CO2) 10/14/2021 21  20 - 32 mmol/L Final     Anion Gap 10/14/2021 10  3 - 14 mmol/L Final     Urea Nitrogen 10/14/2021 16  7 - 30 mg/dL Final     Creatinine 10/14/2021 1.00  0.52 - 1.04 mg/dL Final     Calcium 10/14/2021 9.0  8.5 - 10.1 mg/dL Final     Glucose 10/14/2021 115* 70 - 99 mg/dL Final     Alkaline Phosphatase 10/14/2021 102  40 - 150 U/L Final     AST 10/14/2021 28  0 - 45 U/L Final     ALT 10/14/2021 41  0 - 50 U/L Final     Protein Total 10/14/2021 7.7  6.8 - 8.8 g/dL Final     Albumin 10/14/2021 3.4  3.4 - 5.0 g/dL Final     Bilirubin Total 10/14/2021 0.5  0.2 - 1.3 mg/dL Final     GFR Estimate 10/14/2021 54* >60 mL/min/1.73m2 Final    As of July 11, 2021, eGFR is calculated by the CKD-EPI creatinine equation, without race adjustment. eGFR can be influenced by muscle mass, exercise, and diet. The reported eGFR is an estimation only and is only applicable if the renal function is stable.     TSH 10/14/2021 2.48  0.40 - 4.00 mU/L Final     Cholesterol 10/14/2021 124  <200 mg/dL Final     Triglycerides 10/14/2021 115  <150 mg/dL Final     Direct Measure HDL 10/14/2021 50  >=50 mg/dL Final     LDL Cholesterol Calculated 10/14/2021 51  <=100 mg/dL Final     Non HDL Cholesterol 10/14/2021 74  <130 mg/dL Final     Patient Fasting > 8hrs? 10/14/2021 Yes   Final     WBC Count 10/14/2021 7.5  4.0 - 11.0 10e3/uL Final     RBC Count 10/14/2021 4.41  3.80 - 5.20 10e6/uL Final     Hemoglobin 10/14/2021 13.8  11.7 - 15.7 g/dL Final     Hematocrit 10/14/2021 41.3  35.0 - 47.0 % Final     MCV 10/14/2021 94  78 - 100 fL Final     MCH 10/14/2021 31.3  26.5 - 33.0 pg Final     MCHC 10/14/2021 33.4  31.5 - 36.5 g/dL Final     RDW 10/14/2021 13.4  10.0 - 15.0 % Final     Platelet Count 10/14/2021 301  150 - 450 10e3/uL Final     %  Neutrophils 10/14/2021 66  % Final     % Lymphocytes 10/14/2021 16  % Final     % Monocytes 10/14/2021 14  % Final     % Eosinophils 10/14/2021 4  % Final     % Basophils 10/14/2021 1  % Final     Absolute Neutrophils 10/14/2021 4.9  1.6 - 8.3 10e3/uL Final     Absolute Lymphocytes 10/14/2021 1.2  0.8 - 5.3 10e3/uL Final     Absolute Monocytes 10/14/2021 1.0  0.0 - 1.3 10e3/uL Final     Absolute Eosinophils 10/14/2021 0.3  0.0 - 0.7 10e3/uL Final     Absolute Basophils 10/14/2021 0.1  0.0 - 0.2 10e3/uL Final

## 2021-12-15 ASSESSMENT — ASTHMA QUESTIONNAIRES: ACT_TOTALSCORE: 22

## 2022-01-10 ENCOUNTER — VIRTUAL VISIT (OUTPATIENT)
Dept: FAMILY MEDICINE | Facility: CLINIC | Age: 77
End: 2022-01-10
Payer: COMMERCIAL

## 2022-01-10 DIAGNOSIS — J45.20 MILD INTERMITTENT ASTHMA WITHOUT COMPLICATION: Chronic | ICD-10-CM

## 2022-01-10 DIAGNOSIS — Z20.822 SUSPECTED 2019 NOVEL CORONAVIRUS INFECTION: Primary | ICD-10-CM

## 2022-01-10 PROCEDURE — 99213 OFFICE O/P EST LOW 20 MIN: CPT | Mod: 95 | Performed by: FAMILY MEDICINE

## 2022-01-10 NOTE — PROGRESS NOTES
"Assessment & Plan     Oksana was seen today for fever and nasal congestion.    Diagnoses and all orders for this visit:    Suspected 2019 novel coronavirus infection  -     Symptomatic; Yes; 1/9/2022 COVID-19 Virus (Coronavirus) by PCR Nose; Future    Mild intermittent asthma without complication      Recommend continue all her meds as before , start taking tylenol for fever pain , warning signs and symptoms discussed and when she should be seen in the emergency room as symptoms of COVID can get worse acutely with people who have history of asthma     BMI:   Estimated body mass index is 35.45 kg/m  as calculated from the following:    Height as of 12/14/21: 1.651 m (5' 5\").    Weight as of 12/14/21: 96.6 kg (213 lb).   Weight management plan: Discussed healthy diet and exercise guidelines        No follow-ups on file.    MD LIZZY Simpson [facility]      Oksana Jerez is a 76 year old female who is being evaluated via a billable telephone visit.      What phone number would you like to be contacted at? [phone]  How would you like to obtain your AVS? MyChart  BMI:   Estimated body mass index is 35.45 kg/m  as calculated from the following:    Height as of 12/14/21: 1.651 m (5' 5\").    Weight as of 12/14/21: 96.6 kg (213 lb).           No follow-ups on file.    MD LIZZY Simpson [facility]     Oksana Jerez is a 76 year old female who presents via phone visit today for the following health issues:    COVID suspected   How are you feeling today? No change , just cough congestion per patient no shortness of breath , does have asthma and taking her flovent and albuterol as before   In the past 24 hours have you had shortness of breath when speaking, walking, or climbing stairs? I don't have breathing problems  Do you have a cough? Yes, I have a cough but it's not worse  When is the last time you had a fever greater than 100? " 100.2 last night   Are you having any other symptoms? None   Do you have any other stressors you would like to discuss with your provider? No            PHQ Assesment Total Score(s) 12/14/2021   PHQ-2 Score 0   Some recent data might be hidden       No flowsheet data found.                Review of Systems   Constitutional, HEENT, cardiovascular, pulmonary, gi and gu systems are negative, except as otherwise noted.       Objective          Vitals:  No vitals were obtained today due to virtual visit.    healthy, alert and no distress  PSYCH: Alert and oriented times 3; coherent speech, normal   rate and volume, able to articulate logical thoughts, able   to abstract reason, no tangential thoughts, no hallucinations   or delusions  Her affect is normal  RESP:  + cough  no audible wheezing, able to talk in full sentences  Remainder of exam unable to be completed due to telephone visits    Phone call duration:  15 minutes

## 2022-03-14 DIAGNOSIS — N18.30 BENIGN HYPERTENSION WITH CKD (CHRONIC KIDNEY DISEASE) STAGE III (H): ICD-10-CM

## 2022-03-14 DIAGNOSIS — I12.9 BENIGN HYPERTENSION WITH CKD (CHRONIC KIDNEY DISEASE) STAGE III (H): ICD-10-CM

## 2022-03-14 DIAGNOSIS — E78.5 HYPERLIPIDEMIA LDL GOAL <100: ICD-10-CM

## 2022-03-14 DIAGNOSIS — J45.20 MILD INTERMITTENT ASTHMA WITHOUT COMPLICATION: ICD-10-CM

## 2022-03-14 DIAGNOSIS — M10.9 GOUT OF ANKLE, UNSPECIFIED CAUSE, UNSPECIFIED CHRONICITY, UNSPECIFIED LATERALITY: ICD-10-CM

## 2022-03-16 RX ORDER — SIMVASTATIN 40 MG
40 TABLET ORAL AT BEDTIME
Qty: 90 TABLET | Refills: 1 | Status: SHIPPED | OUTPATIENT
Start: 2022-03-16 | End: 2022-08-29

## 2022-03-16 RX ORDER — ALLOPURINOL 100 MG/1
100 TABLET ORAL DAILY
Qty: 90 TABLET | Refills: 3 | Status: SHIPPED | OUTPATIENT
Start: 2022-03-16

## 2022-03-16 RX ORDER — DEXAMETHASONE 4 MG/1
1 TABLET ORAL 2 TIMES DAILY
Qty: 12 G | Refills: 0 | Status: SHIPPED | OUTPATIENT
Start: 2022-03-16 | End: 2022-06-16

## 2022-03-16 RX ORDER — LOSARTAN POTASSIUM 25 MG/1
TABLET ORAL
Qty: 90 TABLET | Refills: 3 | Status: SHIPPED | OUTPATIENT
Start: 2022-03-16

## 2022-03-16 RX ORDER — ALBUTEROL SULFATE 90 UG/1
2 AEROSOL, METERED RESPIRATORY (INHALATION) EVERY 6 HOURS PRN
Qty: 18 G | Refills: 0 | Status: SHIPPED | OUTPATIENT
Start: 2022-03-16 | End: 2022-06-16

## 2022-03-16 NOTE — TELEPHONE ENCOUNTER
"Last OV 12/14/21     1) Albuterol - Prescription approved per [facility] Refill Protocol.    2) Allopurinol - Routing refill request to provider for review/approval because:  Labs not current (uric acid)     3) Flonase - Prescription approved per [facility] Refill Protocol.    4) Simvastatin - Prescription approved per [facility] Refill Protocol.    5) Losartan - Routing refill request to provider for review/approval because:  BP high     Catalina ASIF, Triage RN  [facility]     Requested Prescriptions   Pending Prescriptions Disp Refills     albuterol (VENTOLIN HFA) 108 (90 Base) MCG/ACT inhaler 18 g 3     Sig: Inhale 2 puffs into the lungs every 6 hours as needed for shortness of breath / dyspnea or wheezing       Asthma Maintenance Inhalers - Anticholinergics Passed - 3/14/2022  8:28 PM        Passed - Patient is age 12 years or older        Passed - Asthma control assessment score within normal limits in last 6 months     Please review ACT score.     ACT Total Scores 6/15/2021 10/14/2021 12/14/2021   ACT TOTAL SCORE - - -   ASTHMA ER VISITS - - -   ASTHMA HOSPITALIZATIONS - - -   ACT TOTAL SCORE (Goal Greater than or Equal to 20) 22 19 22   In the past 12 months, how many times did you visit the emergency room for your asthma without being admitted to the hospital? 0 0 0   In the past 12 months, how many times were you hospitalized overnight because of your asthma? 0 0 0               Passed - Medication is active on med list        Passed - Recent (6 mo) or future (30 days) visit within the authorizing provider's specialty     Patient had office visit in the last 6 months or has a visit in the next 30 days with authorizing provider or within the authorizing provider's specialty.  See \"Patient Info\" tab in inbasket, or \"Choose Columns\" in Meds & Orders section of the refill encounter.           Short-Acting Beta Agonist Inhalers Protocol  Passed - 3/14/2022  8:28 PM        Passed - Patient is " "age 12 or older        Passed - Asthma control assessment score within normal limits in last 6 months     Please review ACT score.           Passed - Medication is active on med list        Passed - Recent (6 mo) or future (30 days) visit within the authorizing provider's specialty     Patient had office visit in the last 6 months or has a visit in the next 30 days with authorizing provider or within the authorizing provider's specialty.  See \"Patient Info\" tab in inbasket, or \"Choose Columns\" in Meds & Orders section of the refill encounter.               allopurinol (ZYLOPRIM) 100 MG tablet 90 tablet 3     Sig: Take 1 tablet (100 mg) by mouth daily       Gout Agents Protocol Failed - 3/14/2022  8:28 PM        Failed - Has Uric Acid on file in past 12 months and value is less than 6     Recent Labs   Lab Test 06/18/19  1222   URIC 5.9     If level is 6mg/dL or greater, ok to refill one time and refer to provider.           Passed - CBC on file in past 12 months     Recent Labs   Lab Test 10/14/21  1138   WBC 7.5   RBC 4.41   HGB 13.8   HCT 41.3                    Passed - ALT on file in past 12 months     Recent Labs   Lab Test 10/14/21  1138   ALT 41             Passed - Recent (12 mo) or future (30 days) visit within the authorizing provider's specialty     Patient has had an office visit with the authorizing provider or a provider within the authorizing providers department within the previous 12 mos or has a future within next 30 days. See \"Patient Info\" tab in inbasket, or \"Choose Columns\" in Meds & Orders section of the refill encounter.              Passed - Medication is active on med list        Passed - Patient is age 18 or older        Passed - No active pregnancy on record        Passed - Normal serum creatinine on file in the past 12 months     Recent Labs   Lab Test 10/14/21  1138   CR 1.00       Ok to refill medication if creatinine is low          Passed - No positive pregnancy test in past " "year           fluticasone (FLOVENT HFA) 110 MCG/ACT inhaler 12 g 3     Sig: Inhale 1 puff into the lungs 2 times daily       Inhaled Steroids Protocol Passed - 3/14/2022  8:28 PM        Passed - Patient is age 12 or older        Passed - Asthma control assessment score within normal limits in last 6 months     Please review ACT score.           Passed - Medication is active on med list        Passed - Recent (6 mo) or future (30 days) visit within the authorizing provider's specialty     Patient had office visit in the last 6 months or has a visit in the next 30 days with authorizing provider or within the authorizing provider's specialty.  See \"Patient Info\" tab in inbasket, or \"Choose Columns\" in Meds & Orders section of the refill encounter.               losartan (COZAAR) 25 MG tablet 90 tablet 2     Sig: TAKE 1 TABLET DAILY       Angiotensin-II Receptors Failed - 3/14/2022  8:28 PM        Failed - Last blood pressure under 140/90 in past 12 months     BP Readings from Last 3 Encounters:   12/14/21 (!) 144/85   10/14/21 109/76   06/15/21 126/84             Passed - Recent (12 mo) or future (30 days) visit within the authorizing provider's specialty     Patient has had an office visit with the authorizing provider or a provider within the authorizing providers department within the previous 12 mos or has a future within next 30 days. See \"Patient Info\" tab in inbasket, or \"Choose Columns\" in Meds & Orders section of the refill encounter.              Passed - Medication is active on med list        Passed - Patient is age 18 or older        Passed - No active pregnancy on record        Passed - Normal serum creatinine on file in past 12 months     Recent Labs   Lab Test 10/14/21  1138   CR 1.00       Ok to refill medication if creatinine is low          Passed - Normal serum potassium on file in past 12 months     Recent Labs   Lab Test 10/14/21  1138   POTASSIUM 3.9                    Passed - No positive " "pregnancy test in past 12 months           simvastatin (ZOCOR) 40 MG tablet 90 tablet 3     Sig: Take 1 tablet (40 mg) by mouth At Bedtime       Statins Protocol Passed - 3/14/2022  8:28 PM        Passed - LDL on file in past 12 months     Recent Labs   Lab Test 10/14/21  1138   LDL 51             Passed - No abnormal creatine kinase in past 12 months     No lab results found.             Passed - Recent (12 mo) or future (30 days) visit within the authorizing provider's specialty     Patient has had an office visit with the authorizing provider or a provider within the authorizing providers department within the previous 12 mos or has a future within next 30 days. See \"Patient Info\" tab in inbasket, or \"Choose Columns\" in Meds & Orders section of the refill encounter.              Passed - Medication is active on med list        Passed - Patient is age 18 or older        Passed - No active pregnancy on record        Passed - No positive pregnancy test in past 12 months             "

## 2022-05-17 ENCOUNTER — LAB (OUTPATIENT)
Dept: URGENT CARE | Facility: URGENT CARE | Age: 77
End: 2022-05-17
Attending: FAMILY MEDICINE
Payer: COMMERCIAL

## 2022-05-17 ENCOUNTER — TELEPHONE (OUTPATIENT)
Dept: NURSING | Facility: CLINIC | Age: 77
End: 2022-05-17

## 2022-05-17 DIAGNOSIS — Z20.822 SUSPECTED 2019 NOVEL CORONAVIRUS INFECTION: ICD-10-CM

## 2022-05-17 LAB — SARS-COV-2 RNA RESP QL NAA+PROBE: POSITIVE

## 2022-05-17 PROCEDURE — U0003 INFECTIOUS AGENT DETECTION BY NUCLEIC ACID (DNA OR RNA); SEVERE ACUTE RESPIRATORY SYNDROME CORONAVIRUS 2 (SARS-COV-2) (CORONAVIRUS DISEASE [COVID-19]), AMPLIFIED PROBE TECHNIQUE, MAKING USE OF HIGH THROUGHPUT TECHNOLOGIES AS DESCRIBED BY CMS-2020-01-R: HCPCS

## 2022-05-17 PROCEDURE — U0005 INFEC AGEN DETEC AMPLI PROBE: HCPCS

## 2022-05-18 ENCOUNTER — VIRTUAL VISIT (OUTPATIENT)
Dept: URGENT CARE | Facility: CLINIC | Age: 77
End: 2022-05-18
Payer: COMMERCIAL

## 2022-05-18 DIAGNOSIS — U07.1 COVID-19: Primary | ICD-10-CM

## 2022-05-18 PROCEDURE — 99213 OFFICE O/P EST LOW 20 MIN: CPT | Mod: 95 | Performed by: NURSE PRACTITIONER

## 2022-05-18 NOTE — TELEPHONE ENCOUNTER
Coronavirus (COVID-19) Notification    Caller Name (Patient, parent, daughter/son, grandparent, etc)  Patient    Reason for call  Notify of Positive Coronavirus (COVID-19) lab results, assess symptoms,  review [facility] recommendations    Lab Result    Lab test:  2019-nCoV rRt-PCR or SARS-CoV-2 PCR    Oropharyngeal AND/OR nasopharyngeal swabs is POSITIVE for 2019-nCoV RNA/SARS-COV-2 PCR (COVID-19 virus)      Gather patient reported symptoms   Assessment   Current Symptoms at time of phone call, reported by patient: (if no symptoms, document: No symptoms] Cough, fever, headache   Date of symptom(s) onset (if applicable) 5/16/2022     If at time of call, Patients symptoms have worsened, the Patient should contact 911 or have someone drive them to Emergency Dept promptly:      If Patient calling 911, inform 911 personal that you have tested positive for the Coronavirus (COVID-19).  Place mask on and await 911 to arrive.    If Emergency Dept, If possible, please have another adult drive you to the Emergency Dept but you need to wear mask when in contact with other people.      Treatment Options:   Patient classified as COVID treatment eligible by Epic high risk algorithm: Yes  Is the patient symptomatic at the time of result notification? Yes. Was the onset of symptoms within the last 5 days? Yes.   There are now oral medications available for the treatment of COVID-19.  Taking one of these medications within the first five days of symptoms (when people may not yet feel severely ill) has been shown to make people feel better, prevent them from getting sicker, and preventing hospitalization and death.   Does the patient agree to have a visit with a provider to discuss treatment options? Yes. Is the patient seen at [facility]?  No: Warm transfer caller to [phone] to be scheduled with a virtual urgent provider.  During transfer, instruct  on appropriate time frame for visit     Review  information with Patient    Your result was positive. This means you have COVID-19 (coronavirus).    How can I protect others?    These guidelines are for isolating before returning to work, school or .    If you DO have symptoms    Stay home and away from others     For at least 5 days after your symptoms started, AND    You are fever free for 24 hours (with no medicine that reduces fever), AND    Your other symptoms are better    Wear a mask for 10 full days anytime you are around others    If you DON'T have symptoms    Stay home and away from others for at least 5 days after your positive test    Wear a mask for 10 full days anytime you are around others    There may be different guidelines for healthcare facilities.  Please check with the specific sites before arriving.    If you have been told by a doctor that you were severely ill with COVID-19 or are immunocompromised, you should isolate for at least 10 days.    You should not go back to work until you meet the guidelines above for ending your home isolation. You don't need to be retested for COVID-19 before going back to work--studies show that you won't spread the virus if it's been at least 10 days since your symptoms started (or 20 days, if you have a weak immune system).    Employers, schools, and daycares: This is an official notice for this person's medical guidelines for returning in-person.  They must meet the above guidelines before going back to work, school or  in person.    You will receive a positive COVID-19 letter via Clicker or the mail soon with additional self-care information (exception, no letters will be sent to presurgical/preprocedure patients).    Would you like me to review some of that information with you now?  No    If you were tested for an upcoming procedure, please contact your provider for next steps.    Yaz George LPN

## 2022-05-18 NOTE — PROGRESS NOTES
Oksana is a 76 year old who is being evaluated via a billable telephone visit.      What phone number would you like to be contacted at? [phone]  How would you like to obtain your AVS? Mail a copy    Assessment & Plan     (U07.1) COVID-19  (primary encounter diagnosis)  Ok to take paxlovid  Discussed hold statin 12 hours prior (last taken last night) and 5 days of treatment on paxlovid  Hold nasonex, flovent and albuterol  Adjusted dosage due to kidney function    [facility]    Subjective   Oksana is a 76 year old who presents for the following health issues covid treatment    HPI       Review of Systems   Constitutional, HEENT, cardiovascular, pulmonary, GI, , musculoskeletal, neuro, skin, endocrine and psych systems are negative, except as otherwise noted.      Objective           Vitals:  No vitals were obtained today due to virtual visit.    Physical Exam   alert and no distress  PSYCH: Alert and oriented times 3; coherent speech, normal   rate and volume, able to articulate logical thoughts, able   to abstract reason, no tangential thoughts, no hallucinations   or delusions  Her affect is normal  RESP: No cough, no audible wheezing, able to talk in full sentences  Remainder of exam unable to be completed due to telephone visits    Phone call duration: 25 minutes

## 2022-06-14 DIAGNOSIS — J45.20 MILD INTERMITTENT ASTHMA WITHOUT COMPLICATION: ICD-10-CM

## 2022-06-16 RX ORDER — ALBUTEROL SULFATE 90 UG/1
AEROSOL, METERED RESPIRATORY (INHALATION)
Qty: 18 G | Refills: 10 | Status: SHIPPED | OUTPATIENT
Start: 2022-06-16

## 2022-06-16 RX ORDER — DEXAMETHASONE 4 MG/1
TABLET ORAL
Qty: 12 G | Refills: 5 | Status: SHIPPED | OUTPATIENT
Start: 2022-06-16

## 2022-06-16 NOTE — TELEPHONE ENCOUNTER
Routing refill request to provider for review/approval because:  Drug interaction warning with Fluticasone.     Shantal Shoemaker RN  [facility]

## 2022-07-08 ENCOUNTER — NURSE TRIAGE (OUTPATIENT)
Dept: FAMILY MEDICINE | Facility: CLINIC | Age: 77
End: 2022-07-08

## 2022-07-08 DIAGNOSIS — N18.30 BENIGN HYPERTENSION WITH CKD (CHRONIC KIDNEY DISEASE) STAGE III (H): ICD-10-CM

## 2022-07-08 DIAGNOSIS — I12.9 BENIGN HYPERTENSION WITH CKD (CHRONIC KIDNEY DISEASE) STAGE III (H): ICD-10-CM

## 2022-07-08 NOTE — TELEPHONE ENCOUNTER
Nurse Triage SBAR    Is this a 2nd Level Triage? NO    Situation: Pt having high BP readings x 1 week with home BP monitor. Over the last week it has been anywhere from 198/103 to 132/86 (1 hour after taking a 2nd Losartan). While on the phone, pt did BP check with reading 155/99. Pt denies any swelling in legs at this time stating it has improved since she messaged through my chart. Pt denies any cp, sob,     Background: Hx of HTN  Assessment: See below    Protocol Recommended Disposition:   See Within 2 Weeks In Office    Recommendation: Pt is currently out of town and wanted to update provider of results. No in clinic She is wondering if a medication change is needed.  No available office appts w/in 2 week.     Routed to provider    Does the patient meet one of the following criteria for ADS visit consideration? 16+ years old, with an [facility] PCP     TIP  Providers, please consider if this condition is appropriate for management at one of our Acute and Diagnostic Services sites.     If patient is a good candidate, please use dotphrase <dot>triageresponse and select Refer to ADS to document.  Reason for Disposition    Systolic BP >= 130 OR Diastolic >= 80, and is taking BP medications    Additional Information    Negative: Sounds like a life-threatening emergency to the triager    Negative: Pregnant > 20 weeks or postpartum (< 6 weeks after delivery) and new hand or face swelling    Negative: Pregnant > 20 weeks and BP > 140/90    Negative: Systolic BP >= 160 OR Diastolic >= 100, and any cardiac or neurologic symptoms (e.g., chest pain, difficulty breathing, unsteady gait, blurred vision)    Negative: Patient sounds very sick or weak to the triager    Negative: BP Systolic BP >= 140 OR Diastolic >= 90 and postpartum (from 0 to 6 weeks after delivery)    Negative: Systolic BP >= 180 OR Diastolic >= 110, and missed most recent dose of blood pressure medication    Negative: Systolic BP >= 180 OR Diastolic >= 110     "Negative: Patient wants to be seen    Negative: Ran out of BP medications    Negative: Taking BP medications and feels is having side effects (e.g., impotence, cough, dizziness)    Negative: Systolic BP >= 160 OR Diastolic >= 100    Negative: Systolic BP >= 130 OR Diastolic >= 80, and pregnant    Answer Assessment - Initial Assessment Questions  1. BLOOD PRESSURE: \"What is the blood pressure?\" \"Did you take at least two measurements 5 minutes apart?\"      Over the last week it has been anywhere from 198/103 to 132/86 (1 hour after taking a 2nd Losartan)     BP taken during call with RN: 159/99     2. ONSET: \"When did you take your blood pressure?\"      Over the last week    3. HOW: \"How did you obtain the blood pressure?\" (e.g., visiting nurse, automatic home BP monitor)     Automatic wrist BP monitor    4. HISTORY: \"Do you have a history of high blood pressure?\"      Yes    5. MEDICATIONS: \"Are you taking any medications for blood pressure?\" \"Have you missed any doses recently?\"      Losartan, sometimes takes a second dose if bp is high     6. OTHER SYMPTOMS: \"Do you have any symptoms?\" (e.g., headache, chest pain, blurred vision, difficulty breathing, weakness)      No    7. PREGNANCY: \"Is there any chance you are pregnant?\" \"When was your last menstrual period?\"      no    Protocols used: HIGH BLOOD PRESSURE-A-OH    SEE IN OFFICE WITHIN 2 WEEKS:   * You need to be examined.   * Let me give you an appointment.      CALL BACK IF:  * Headache, blurred vision, difficulty talking, or difficulty walking occurs  * Chest pain or difficulty breathing occurs  * You want to go into the office for a blood pressure check  * You become worse        Patient/Caregiver understands and will follow care advice? No, wishes to speak to PCP     Arely CARPENTER RN  EP Triage    "

## 2022-07-08 NOTE — TELEPHONE ENCOUNTER
Increase losartan to 50 mg daily.    I have ordered this which pharmacy would you like me to send it to?    Mahin Howard MD on 7/8/2022 at 4:05 PM

## 2022-07-08 NOTE — TELEPHONE ENCOUNTER
Triage Patient Outreach    Attempt #2    Was call answered?  No.  Left voicemail to return call to T[facility] at [facility]  Pt has also been responded to in kiki Rosas RN

## 2022-07-31 ASSESSMENT — SLEEP AND FATIGUE QUESTIONNAIRES
HOW LIKELY ARE YOU TO NOD OFF OR FALL ASLEEP WHILE SITTING AND READING: SLIGHT CHANCE OF DOZING
HOW LIKELY ARE YOU TO NOD OFF OR FALL ASLEEP WHILE SITTING AND TALKING TO SOMEONE: WOULD NEVER DOZE
HOW LIKELY ARE YOU TO NOD OFF OR FALL ASLEEP WHEN YOU ARE A PASSENGER IN A CAR FOR AN HOUR WITHOUT A BREAK: WOULD NEVER DOZE
HOW LIKELY ARE YOU TO NOD OFF OR FALL ASLEEP WHILE LYING DOWN TO REST IN THE AFTERNOON WHEN CIRCUMSTANCES PERMIT: SLIGHT CHANCE OF DOZING
HOW LIKELY ARE YOU TO NOD OFF OR FALL ASLEEP WHILE SITTING QUIETLY AFTER LUNCH WITHOUT ALCOHOL: SLIGHT CHANCE OF DOZING
HOW LIKELY ARE YOU TO NOD OFF OR FALL ASLEEP WHILE WATCHING TV: SLIGHT CHANCE OF DOZING
HOW LIKELY ARE YOU TO NOD OFF OR FALL ASLEEP IN A CAR, WHILE STOPPED FOR A FEW MINUTES IN TRAFFIC: WOULD NEVER DOZE
HOW LIKELY ARE YOU TO NOD OFF OR FALL ASLEEP WHILE SITTING INACTIVE IN A PUBLIC PLACE: WOULD NEVER DOZE

## 2022-08-03 ENCOUNTER — OFFICE VISIT (OUTPATIENT)
Dept: SLEEP MEDICINE | Facility: CLINIC | Age: 77
End: 2022-08-03
Payer: COMMERCIAL

## 2022-08-03 VITALS
OXYGEN SATURATION: 95 % | HEART RATE: 85 BPM | BODY MASS INDEX: 35.65 KG/M2 | HEIGHT: 65 IN | DIASTOLIC BLOOD PRESSURE: 82 MMHG | SYSTOLIC BLOOD PRESSURE: 134 MMHG | WEIGHT: 214 LBS

## 2022-08-03 DIAGNOSIS — G47.33 OSA (OBSTRUCTIVE SLEEP APNEA): Primary | ICD-10-CM

## 2022-08-03 PROCEDURE — 99213 OFFICE O/P EST LOW 20 MIN: CPT | Performed by: INTERNAL MEDICINE

## 2022-08-03 NOTE — PATIENT INSTRUCTIONS
Your There is no height or weight on file to calculate BMI.  Weight management is a personal decision.  If you are interested in exploring weight loss strategies, the following discussion covers the approaches that may be successful. Body mass index (BMI) is one way to tell whether you are at a healthy weight, overweight, or obese. It measures your weight in relation to your height.  A BMI of 18.5 to 24.9 is in the healthy range. A person with a BMI of 25 to 29.9 is considered overweight, and someone with a BMI of 30 or greater is considered obese. More than two-thirds of American adults are considered overweight or obese.  Being overweight or obese increases the risk for further weight gain. Excess weight may lead to heart disease and diabetes.  Creating and following plans for healthy eating and physical activity may help you improve your health.  Weight control is part of healthy lifestyle and includes exercise, emotional health, and healthy eating habits. Careful eating habits lifelong are the mainstay of weight control. Though there are significant health benefits from weight loss, long-term weight loss with diet alone may be very difficult to achieve- studies show long-term success with dietary management in less than 10% of people. Attaining a healthy weight may be especially difficult to achieve in those with severe obesity. In some cases, medications, devices and surgical management might be considered.  What can you do?  If you are overweight or obese and are interested in methods for weight loss, you should discuss this with your provider.     Consider reducing daily calorie intake by 500 calories.     Keep a food journal.     Avoiding skipping meals, consider cutting portions instead.    Diet combined with exercise helps maintain muscle while optimizing fat loss. Strength training is particularly important for building and maintaining muscle mass. Exercise helps reduce stress, increase energy, and  improves fitness. Increasing exercise without diet control, however, may not burn enough calories to loose weight.       Start walking three days a week 10-20 minutes at a time    Work towards walking thirty minutes five days a week     Eventually, increase the speed of your walking for 1-2 minutes at time    In addition, we recommend that you review healthy lifestyles and methods for weight loss available through the National Institutes of Health patient information sites:  http://win.niddk.nih.gov/publications/index.htm    And look into health and wellness programs that may be available through your health insurance provider, employer, local community center, or estephanie club.

## 2022-08-03 NOTE — NURSING NOTE
"Chief Complaint   Patient presents with     CPAP Follow Up       Initial /82   Pulse 85   Ht 1.651 m (5' 5\")   Wt 97.1 kg (214 lb)   SpO2 95%   BMI 35.61 kg/m   Estimated body mass index is 35.61 kg/m  as calculated from the following:    Height as of this encounter: 1.651 m (5' 5\").    Weight as of this encounter: 97.1 kg (214 lb).    Medication Reconciliation: complete  ESS 4  Erica Laurent MA  "

## 2022-08-03 NOTE — PROGRESS NOTES
Obstructive Sleep Apnea - PAP Follow-Up Visit:    Chief Complaint   Patient presents with     CPAP Follow Up     Oksana Jerez comes in today for follow-up of their severe sleep apnea, managed with CPAP.     4/4/2018 [place] [facility] Sleep Study (221.0 lbs) - AHI 34.8, RDI 39.6, Supine AHI 65.8, REM AHI 60.0, Low O2 76.6%, Time Spent ?88% 26.4 minutes / Time Spent ?89% 50.6 minutes.    Do you use a CPAP Machine at home: Yes  Overall, on a scale of 0-10 how would you rate your CPAP (0 poor, 10 great): 8  Is your mask comfortable: Yes  If not, why:    Is you mask leaking: No  If yes, where do you feel it:    How many night per week does the mask leak (0-7):    Do you notice snoring with mask on: No  Do you notice gasping arousals with mask on: No  Are you having significant oral or nasal dryness: Yes  Is the pressure setting comfortable: Yes  In not, why:    What type of mask do you use: Full Face Mask  What is your typical bedtime: 11pm  How long does it take you to go to sleep on PAP therapy: 15 minutes -1 hour  What time do you typically get out of bed for the day: 7am-8 am  How many hours on average per night are you using PAP therapy: 6-8, i think  How many hours are you sleeping per night: 6-8  Do you feel well rested in the morning: Yes    EPWORTH SLEEPINESS SCALE      Nursery Sleepiness Scale ( HOWARD Villatoro  [id number]<br>ESS - [place]/English - Final version - 21 Nov 07 - [facility] [place].) 7/31/2022   Sitting and reading Slight chance of dozing   Watching TV Slight chance of dozing   Sitting, inactive in a public place (e.g. a theatre or a meeting) Would never doze   As a passenger in a car for an hour without a break Would never doze   Lying down to rest in the afternoon when circumstances permit Slight chance of dozing   Sitting and talking to someone Would never doze   Sitting quietly after a lunch without alcohol Slight chance of dozing   In a car, while stopped for a few minutes in traffic  Would never doze   Deepwater Score (MC) 4   Deepwater Score (Sleep) 4       INSOMNIA SEVERITY INDEX (MARYCHUY)      Insomnia Severity Index (MARYCHUY) 7/31/2022   Difficulty falling asleep 2   Difficulty staying asleep 2   Problems waking up too early 1   How SATISFIED/DISSATISFIED are you with your CURRENT sleep pattern? 2   How NOTICEABLE to others do you think your sleep problem is in terms of impairing the quality of your life? 0   How WORRIED/DISTRESSED are you about your current sleep problem? 1   To what extent do you consider your sleep problem to INTERFERE with your daily functioning (e.g. daytime fatigue, mood, ability to function at work/daily chores, concentration, memory, mood, etc.) CURRENTLY? 1   MARYCHUY Total Score 9       Guidelines for Scoring/Interpretation:  Total score categories:  0-7 = No clinically significant insomnia   8-14 = Subthreshold insomnia   15-21 = Clinical insomnia (moderate severity)  22-28 = Clinical insomnia (severe)  Used via courtesy of www.ActiveReplay.va.gov with permission from Abdulkadir Antonio PhD., [facility]    Respironics (dreamstation 2)   Auto-PAP 8.0 - 15.0 cmH2O 30 day usage data:    63% of days with > 4 hours of use. 9/30 days with no use.   Average use 309 minutes per day.   Average leak 32.91 LPM.  Average % of night in large leak 0%.    CPAP 90% pressure 9cm.   AHI 4.19 events per hour.      Problem List:  Patient Active Problem List    Diagnosis Date Noted     Obesity; BMI 35-40 (H) 06/01/2018     Priority: Medium     TENA (obstructive sleep apnea) 04/08/2018     Priority: Medium     Urinary incontinence, unspecified type 05/29/2017     Priority: Medium     Witnessed apneic spells 06/21/2016     Priority: Medium     Gout 04/15/2015     Priority: Medium     Advanced directives, counseling/discussion 10/22/2014     Priority: Medium     Advance Care Planning:   ACP Review and Resources Provided:  Reviewed chart for advance care plan.  Oksana Jerez has no plan or code status  "on file. Letter sent..   Added by Shanda Vines on 10/22/2014             Mild intermittent asthma 05/14/2014     Priority: Medium     Benign hypertension with CKD (chronic kidney disease) stage III (H) 11/19/2012     Priority: Medium     CKD (chronic kidney disease) stage 3, GFR 30-59 ml/min (H) 11/15/2010     Priority: Medium     Hyperlipidemia 10/31/2010     Priority: Medium     Esophageal reflux 07/25/2006     Priority: Medium     BENIGN HYPERTENSION; goal <140/90 02/11/2003     Priority: Medium     Allergic rhinitis 02/11/2003     Priority: Medium     Problem list name updated by automated process. Provider to review            /82   Pulse 85   Ht 1.651 m (5' 5\")   Wt 97.1 kg (214 lb)   SpO2 95%   BMI 35.61 kg/m      Impression/Plan:     Severe sleep apnea.     -  Patient is regularly using CPAP and benefiting from treatment.     - I reviewed download data and graphs from her device for last 30 days. Fairly regular compliance and normal residual AHI is demonstrated. Breaks in treatment were explained by times when she was out of town.     Plan:     - Continue auto PAP therapy       Oksana Jerez will follow up in about 1 year(s).     I spent a total of 20 minutes for this appointment on this date of service which include time spent before, during and after the visit for chart review, patient care, counseling and coordination of care.    Ian Shepard MD    CC:  Mahin Howard,   "

## 2022-08-25 DIAGNOSIS — E78.5 HYPERLIPIDEMIA LDL GOAL <100: ICD-10-CM

## 2022-08-29 RX ORDER — SIMVASTATIN 40 MG
TABLET ORAL
Qty: 90 TABLET | Refills: 0 | Status: SHIPPED | OUTPATIENT
Start: 2022-08-29 | End: 2022-11-30

## 2022-10-16 ENCOUNTER — HEALTH MAINTENANCE LETTER (OUTPATIENT)
Age: 77
End: 2022-10-16

## 2022-11-28 DIAGNOSIS — E78.5 HYPERLIPIDEMIA LDL GOAL <100: ICD-10-CM

## 2022-11-30 RX ORDER — SIMVASTATIN 40 MG
TABLET ORAL
Qty: 90 TABLET | Refills: 0 | Status: SHIPPED | OUTPATIENT
Start: 2022-11-30

## 2022-11-30 NOTE — TELEPHONE ENCOUNTER
Routing refill request to provider for review/approval because:  Labs not current:  LDL      LDL Cholesterol Calculated   Date Value Ref Range Status   10/14/2021 51 <=100 mg/dL Final   09/28/2020 55 <100 mg/dL Final     Comment:     Desirable:       <100 mg/dl

## 2022-12-04 ENCOUNTER — HEALTH MAINTENANCE LETTER (OUTPATIENT)
Age: 77
End: 2022-12-04

## 2023-10-09 DIAGNOSIS — G47.33 OSA (OBSTRUCTIVE SLEEP APNEA): Primary | Chronic | ICD-10-CM

## 2024-01-13 ENCOUNTER — HEALTH MAINTENANCE LETTER (OUTPATIENT)
Age: 79
End: 2024-01-13

## 2024-09-13 NOTE — PATIENT INSTRUCTIONS

## 2025-01-26 ENCOUNTER — HEALTH MAINTENANCE LETTER (OUTPATIENT)
Age: 80
End: 2025-01-26